# Patient Record
Sex: FEMALE | Race: WHITE | NOT HISPANIC OR LATINO | Employment: FULL TIME | URBAN - METROPOLITAN AREA
[De-identification: names, ages, dates, MRNs, and addresses within clinical notes are randomized per-mention and may not be internally consistent; named-entity substitution may affect disease eponyms.]

---

## 2018-09-17 ENCOUNTER — OFFICE VISIT (OUTPATIENT)
Dept: OBSTETRICS AND GYNECOLOGY | Age: 43
End: 2018-09-17

## 2018-09-17 VITALS
WEIGHT: 221 LBS | BODY MASS INDEX: 31.64 KG/M2 | SYSTOLIC BLOOD PRESSURE: 122 MMHG | DIASTOLIC BLOOD PRESSURE: 78 MMHG | HEIGHT: 70 IN

## 2018-09-17 DIAGNOSIS — Z01.419 WELL WOMAN EXAM WITH ROUTINE GYNECOLOGICAL EXAM: Primary | ICD-10-CM

## 2018-09-17 DIAGNOSIS — Z12.31 SCREENING MAMMOGRAM, ENCOUNTER FOR: ICD-10-CM

## 2018-09-17 PROCEDURE — 99396 PREV VISIT EST AGE 40-64: CPT | Performed by: NURSE PRACTITIONER

## 2018-09-17 NOTE — PROGRESS NOTES
[facility]  Routine Annual Visit    2018    Patient: Pallavi Graham          MR#:[id number]      History of Present Illness    43 y.o. female  who presents for annual exam. She was last here for prenatal care . Daughter just turned 2 and is doing great. Last pap negative , HPV negative. Mammogram 2015- additional images suggested. Pallavi reports these were done and normal- calling for results.  Pallavi is using tubal ligation for contraception. Normal monthly cycles. However, she reports she is not used to having heavy cycles lasting almost full week- was on OCPs for years with light cycles. She is interested in discussing options for reducing flow.     Patient's last menstrual period was 2018.  Obstetric History:  OB History      Para Term  AB Living    3 3 3 0 0 3    SAB TAB Ectopic Molar Multiple Live Births    0 0 0   0 3        Obstetric Comments    3/16/1619 weeks normal completed anatomic survey, low-lying placenta, female fetus  6.14.16 - 32-2 weeks, EFW 76%, AC 86% ,BPP 8/8 , JHF  7.5.16 - 35-2 weeks, EFW 50%, AC 86%, SASKIA-18cm, BPP 8/8 -JHF         Menstrual History:     Patient's last menstrual period was 2018.       Sexual History:       ________________________________________  Patient Active Problem List   Diagnosis   • Sterilization consult       Past Medical History:   Diagnosis Date   • Migraine    • PONV (postoperative nausea and vomiting)        Past Surgical History:   Procedure Laterality Date   • ADENOIDECTOMY     • CHOLECYSTECTOMY      [facility] [place]   • TONSILLECTOMY     • TUBAL COAGULATION LAPAROSCOPIC Bilateral 2016    Procedure: TUBAL FALLOPE FILSHIE CLIPPING LAPAROSCOPIC;  Surgeon: Himanshu Ambrocio MD;  Location: [facility] OR;  Service:    • WISDOM TOOTH EXTRACTION         History   Smoking Status   • Never Smoker   Smokeless Tobacco   • Never Used       Family History   Problem Relation Age of Onset   • Hypertension Mother  "   • Diabetes Maternal Grandmother    • Ovarian cancer Maternal Grandmother    • Diabetes Maternal Grandfather    • Diabetes Paternal Grandmother    • Diabetes Paternal Grandfather    • Heart defect Sister        Prior to Admission medications    Medication Sig Start Date End Date Taking? Authorizing Provider   oxyCODONE-acetaminophen (PERCOCET) 5-325 MG per tablet Take 1 tablet by mouth every 4 (four) hours as needed for moderate pain (4-6). 9/1/16   Himanshu Ambrocio MD   Prenatal MV-Min-Fe Fum-FA-DHA (PRENATAL 1 PO) Take 1 tablet by mouth daily. 11/23/15   ProviderJerry MD     ________________________________________      The following portions of the patient's history were reviewed and updated as appropriate: allergies, current medications, past family history, past medical history, past social history, past surgical history and problem list.    Review of Systems   Constitutional: Negative.    HENT: Negative.    Eyes: Negative for visual disturbance.   Respiratory: Negative for cough, shortness of breath and wheezing.    Cardiovascular: Negative for chest pain, palpitations and leg swelling.   Gastrointestinal: Negative for abdominal distention, abdominal pain, blood in stool, constipation, diarrhea, nausea and vomiting.   Endocrine: Negative for cold intolerance and heat intolerance.   Genitourinary: Negative for difficulty urinating, dyspareunia, dysuria, frequency, genital sores, hematuria, menstrual problem, pelvic pain, urgency, vaginal bleeding, vaginal discharge and vaginal pain.   Musculoskeletal: Negative.    Skin: Negative.    Neurological: Negative for dizziness, weakness, light-headedness, numbness and headaches.   Hematological: Negative.    Psychiatric/Behavioral: Negative.    Breasts: negative for lumps skin changes, dimpling, swelling, nipple changes/discharge bilaterally       Objective   Physical Exam    /78   Ht 177.8 cm (70\")   Wt 100 kg (221 lb)   LMP 09/06/2018   BMI 31.71 " "kg/m²    BP Readings from Last 3 Encounters:   09/17/18 122/78   09/01/16 113/67   08/30/16 113/78      Wt Readings from Last 3 Encounters:   09/17/18 100 kg (221 lb)   09/01/16 88.9 kg (196 lb)   08/30/16 87.1 kg (192 lb)        BMI: Estimated body mass index is 31.71 kg/m² as calculated from the following:    Height as of this encounter: 177.8 cm (70\").    Weight as of this encounter: 100 kg (221 lb).        General:   alert, appears stated age and cooperative   Heart: regular rate and rhythm, S1, S2 normal, no murmur, click, rub or gallop   Lungs: clear to auscultation bilaterally   Abdomen: soft, non-tender, without masses or organomegaly   Breast: inspection negative, no nipple discharge or bleeding, no masses or nodularity palpable   Vulva: normal   Vagina: normal mucosa, normal discharge   Cervix: no cervical motion tenderness and no lesions   Uterus: normal size, mobile, non-tender or normal shape and consistency   Adnexa: exam limited by habitus     Assessment:    1. Normal annual exam   2. Heavy cycles- options discussed- OCPs, mirena, ablation. Pallavi desires low dose oral contraceptives for now but is highly considering uterine ablation. Consult appointment made with Dr. Ambrocio. OCP: initiation of contraceptive pills. We have discussed the risks and benefits of oral contraceptives. We have discussed the health benefits of oral contraceptives including improvement of acne, dysmenorrhea, PMS, decreased risk ovarian and uterine cancer later in life and decreased menstrual flow or suppression. We have also discussed possible side effects including nausea or vomiting at initiation, breast tenderness, and increased risk deep vein thrombosis. We have reviewed the ACHES (abdominal pain, chest pain, headache, leg pain, vision changes, shortness of breath) and to notify our office in the event of any of these symptoms. She verbalizes understanding  3. Healthy lifestyle modifications discussed, counseled on self " breast exams and bone health        Plan:    [x]  Rx: lo loestrin  []  Mammogram request made  [x]  PAP done  []  Occult fecal blood test (Insure)  []  Labs:   []  GC/Chl/TV  []  DEXA scan   []  Referral for colonoscopy:           RYLEY Burden  9/17/2018 1:44 PM

## 2018-09-21 ENCOUNTER — TELEPHONE (OUTPATIENT)
Dept: OBSTETRICS AND GYNECOLOGY | Age: 43
End: 2018-09-21

## 2018-09-21 LAB
CYTOLOGIST CVX/VAG CYTO: NORMAL
CYTOLOGY CVX/VAG DOC THIN PREP: NORMAL
DX ICD CODE: NORMAL
HIV 1 & 2 AB SER-IMP: NORMAL
HPV I/H RISK 4 DNA CVX QL PROBE+SIG AMP: NEGATIVE
Lab: NORMAL
OTHER STN SPEC: NORMAL
PATH REPORT.FINAL DX SPEC: NORMAL
STAT OF ADQ CVX/VAG CYTO-IMP: NORMAL

## 2018-09-21 NOTE — TELEPHONE ENCOUNTER
----- Message from RYLEY Delaney sent at 9/21/2018  8:53 AM EDT -----  Please inform patient her pap smear returned negative (normal). Thank you.

## 2018-10-04 ENCOUNTER — HOSPITAL ENCOUNTER (OUTPATIENT)
Dept: MAMMOGRAPHY | Facility: HOSPITAL | Age: 43
Discharge: HOME | End: 2018-10-04
Admitting: NURSE PRACTITIONER

## 2018-10-04 DIAGNOSIS — Z12.31 SCREENING MAMMOGRAM, ENCOUNTER FOR: ICD-10-CM

## 2018-10-04 PROCEDURE — 77067 SCR MAMMO BI INCL CAD: CPT

## 2018-10-22 ENCOUNTER — PROCEDURE VISIT (OUTPATIENT)
Dept: OBSTETRICS AND GYNECOLOGY | Age: 43
End: 2018-10-22

## 2018-10-22 ENCOUNTER — OFFICE VISIT (OUTPATIENT)
Dept: OBSTETRICS AND GYNECOLOGY | Age: 43
End: 2018-10-22

## 2018-10-22 VITALS
HEIGHT: 70 IN | BODY MASS INDEX: 31.78 KG/M2 | DIASTOLIC BLOOD PRESSURE: 88 MMHG | WEIGHT: 222 LBS | SYSTOLIC BLOOD PRESSURE: 136 MMHG

## 2018-10-22 DIAGNOSIS — N93.9 ABNORMAL UTERINE BLEEDING (AUB): Primary | ICD-10-CM

## 2018-10-22 PROCEDURE — 99213 OFFICE O/P EST LOW 20 MIN: CPT | Performed by: OBSTETRICS & GYNECOLOGY

## 2018-10-22 PROCEDURE — 76830 TRANSVAGINAL US NON-OB: CPT | Performed by: OBSTETRICS & GYNECOLOGY

## 2018-10-22 RX ORDER — HEPATITIS A VACCINE 1440 [IU]/ML
INJECTION, SUSPENSION INTRAMUSCULAR SEE ADMIN INSTRUCTIONS
Refills: 1 | COMMUNITY
Start: 2018-10-17 | End: 2019-10-02

## 2018-10-22 RX ORDER — FEXOFENADINE HCL 180 MG/1
180 TABLET ORAL AS NEEDED
COMMUNITY

## 2018-10-22 RX ORDER — INFLUENZA A VIRUS A/MICHIGAN/45/2015 X-275 (H1N1) ANTIGEN (FORMALDEHYDE INACTIVATED), INFLUENZA A VIRUS A/SINGAPORE/INFIMH-16-0019/2016 IVR-186 (H3N2) ANTIGEN (FORMALDEHYDE INACTIVATED), INFLUENZA B VIRUS B/PHUKET/3073/2013 ANTIGEN (FORMALDEHYDE INACTIVATED), AND INFLUENZA B VIRUS B/MARYLAND/15/2016 BX-69A ANTIGEN (FORMALDEHYDE INACTIVATED) 15; 15; 15; 15 UG/.5ML; UG/.5ML; UG/.5ML; UG/.5ML
INJECTION, SUSPENSION INTRAMUSCULAR
Refills: 0 | COMMUNITY
Start: 2018-10-19 | End: 2019-10-02

## 2018-10-22 RX ORDER — RIZATRIPTAN BENZOATE 10 MG/1
10 TABLET ORAL ONCE AS NEEDED
COMMUNITY
Start: 2018-07-26 | End: 2019-10-02 | Stop reason: SDUPTHER

## 2018-10-22 NOTE — PROGRESS NOTES
10/22/2018      Patient:  Pallavi Graham   MR#:[id number]    Office note    Chief Complaint   Patient presents with   • Consult     Ablation per EP       Subjective     History of Present Illness  43 y.o. female  tense for consult on possible endometrial ablation.  The patient reports since having had her tubal ligation and discontinuing oral contraceptive pills that her periods are prolonged and heavy.  Her menstrual cycles are mostly regular with a cycle approximately 28 days but she has heavy vaginal bleeding that's intermittent for at least 7 days each month.  The patient was started on oral contraceptive pills by the nurse practitioner but has not yet completed a month of OCPs.  The patient previously reported good cycle control and management of menstrual flow with oral contraceptive pills.    We discussed options for managing her abnormal uterine bleeding.  She needs ultrasound to evaluate the uterine cavity and exclude uterine pathology.  With prior success with oral contraceptive pills I suggested she continue those for several months and evaluate their efficacy.  If oral contraceptive pills prove ineffective ablation could be considered.      Patient Active Problem List   Diagnosis   • Abnormal uterine bleeding (AUB)       Past Medical History:   Diagnosis Date   • Migraine    • PONV (postoperative nausea and vomiting)        Past Surgical History:   Procedure Laterality Date   • ADENOIDECTOMY     • CHOLECYSTECTOMY      [facility]   • TONSILLECTOMY     • TUBAL COAGULATION LAPAROSCOPIC Bilateral 2016    Procedure: TUBAL FALLOPE FILSHIE CLIPPING LAPAROSCOPIC;  Surgeon: Himanshu Ambrocio MD;  Location: [facility];  Service:    • WISDOM TOOTH EXTRACTION         Obstetric History:  OB History      Para Term  AB Living    3 3 3 0 0 3    SAB TAB Ectopic Molar Multiple Live Births    0 0 0   0 3         Menstrual History:     Patient's last menstrual period was 10/01/2018 (exact  date).       #: 1, Date: 2004, Sex: Male, Weight: 3147 g (6 lb 15 oz), GA: 39w0d, Delivery: Vaginal, Spontaneous Delivery, Apgar1: None, Apgar5: None, Living: Living, Birth Comments: reviewed by dr. anne marie juarez    #: 2, Date: 2009, Sex: Male, Weight: 3799 g (8 lb 6 oz), GA: 40w0d, Delivery: Vaginal, Spontaneous Delivery, Apgar1: None, Apgar5: None, Living: Living, Birth Comments: reviewed by apple juarez    #: 3, Date: 08/01/16, Sex: Female, Weight: 3606 g (7 lb 15.2 oz), GA: 39w1d, Delivery: Vaginal, Spontaneous Delivery, Apgar1: 9, Apgar5: 9, Living: Living, Birth Comments: None      Family History   Problem Relation Age of Onset   • Hypertension Mother    • Diabetes Maternal Grandmother    • Ovarian cancer Maternal Grandmother    • Diabetes Maternal Grandfather    • Diabetes Paternal Grandmother    • Diabetes Paternal Grandfather    • Heart defect Sister        Social History   Substance Use Topics   • Smoking status: Never Smoker   • Smokeless tobacco: Never Used   • Alcohol use No       Patient has no known allergies.      Current Outpatient Prescriptions:   •  fexofenadine (ALLEGRA) 180 MG tablet, Take 180 mg by mouth As Needed., Disp: , Rfl:   •  FLUZONE QUADRIVALENT suspension IM suspension, USE AS DIRECTED. FOR ONE DOSE, Disp: , Rfl: 0  •  HAVRIX 1440 EL U/ML vaccine, See Admin Instructions., Disp: , Rfl: 1  •  Multiple Vitamin (MULTI-VITAMIN DAILY PO), Take  by mouth., Disp: , Rfl:   •  Norethin-Eth Estrad-Fe Biphas (LO LOESTRIN FE) 1 MG-10 MCG / 10 MCG tablet, Take 1 tablet by mouth Daily., Disp: 28 tablet, Rfl: 12  •  rizatriptan (MAXALT) 10 MG tablet, , Disp: , Rfl:   •  oxyCODONE-acetaminophen (PERCOCET) 5-325 MG per tablet, Take 1 tablet by mouth every 4 (four) hours as needed for moderate pain (4-6)., Disp: 20 tablet, Rfl: 0  •  Prenatal MV-Min-Fe Fum-FA-DHA (PRENATAL 1 PO), Take 1 tablet by mouth daily., Disp: , Rfl:     The following portions of the patient's history were reviewed and  "updated as appropriate: allergies, current medications, past family history, past medical history, past social history, past surgical history and problem list.    Review of Systems   Constitutional: Negative.    Respiratory: Negative.    Cardiovascular: Negative.    Gastrointestinal: Negative.    Genitourinary: Positive for menstrual problem and vaginal bleeding.   Psychiatric/Behavioral: Negative.        BP Readings from Last 3 Encounters:   10/22/18 136/88   09/17/18 122/78   09/01/16 113/67      Wt Readings from Last 3 Encounters:   10/22/18 101 kg (222 lb)   09/17/18 100 kg (221 lb)   09/01/16 88.9 kg (196 lb)      BMI: Estimated body mass index is 31.85 kg/m² as calculated from the following:    Height as of this encounter: 177.8 cm (70\").    Weight as of this encounter: 101 kg (222 lb).  BSA: Estimated body surface area is 2.18 meters squared as calculated from the following:    Height as of this encounter: 177.8 cm (70\").    Weight as of this encounter: 101 kg (222 lb).    Objective   Physical Exam   Constitutional: She is oriented to person, place, and time. She appears well-developed and well-nourished.   HENT:   Head: Normocephalic and atraumatic.   Cardiovascular: Normal rate.    Pulmonary/Chest: Effort normal.   Abdominal: Soft. Bowel sounds are normal. She exhibits no distension. There is no tenderness.   Genitourinary: Vagina normal and uterus normal.   Genitourinary Comments: Small normal mobile uterus without significant pathology.  Exam is mildly limited by habitus   Neurological: She is alert and oriented to person, place, and time.   Skin: Skin is warm and dry.   Psychiatric: She has a normal mood and affect. Her behavior is normal. Judgment and thought content normal.   Nursing note and vitals reviewed.        Assessment/Plan     Pallavi was seen today for consult.    Diagnoses and all orders for this visit:    Abnormal uterine bleeding (AUB)    Suggest GYN ultrasound to exclude uterine " pathology.  Continue oral contraceptive pills.  If they become ineffective will consider ablation      No Follow-up on file.        Himanshu Ambrocio MD   10/22/2018 11:05 AM

## 2019-10-02 ENCOUNTER — OFFICE VISIT (OUTPATIENT)
Dept: OBSTETRICS AND GYNECOLOGY | Age: 44
End: 2019-10-02

## 2019-10-02 VITALS
HEIGHT: 70 IN | SYSTOLIC BLOOD PRESSURE: 124 MMHG | WEIGHT: 216 LBS | BODY MASS INDEX: 30.92 KG/M2 | DIASTOLIC BLOOD PRESSURE: 76 MMHG

## 2019-10-02 DIAGNOSIS — Z12.4 PAP SMEAR FOR CERVICAL CANCER SCREENING: ICD-10-CM

## 2019-10-02 DIAGNOSIS — Z13.89 SCREENING FOR BLOOD OR PROTEIN IN URINE: ICD-10-CM

## 2019-10-02 DIAGNOSIS — Z12.31 SCREENING MAMMOGRAM, ENCOUNTER FOR: ICD-10-CM

## 2019-10-02 DIAGNOSIS — Z01.419 WELL FEMALE EXAM WITH ROUTINE GYNECOLOGICAL EXAM: Primary | ICD-10-CM

## 2019-10-02 DIAGNOSIS — G43.C0 PERIODIC HEADACHE SYNDROME, NOT INTRACTABLE: ICD-10-CM

## 2019-10-02 DIAGNOSIS — N93.9 ABNORMAL UTERINE BLEEDING (AUB): ICD-10-CM

## 2019-10-02 LAB
BILIRUB BLD-MCNC: NEGATIVE MG/DL
CLARITY, POC: CLEAR
COLOR UR: YELLOW
GLUCOSE UR STRIP-MCNC: NEGATIVE MG/DL
KETONES UR QL: NEGATIVE
LEUKOCYTE EST, POC: NEGATIVE
NITRITE UR-MCNC: NEGATIVE MG/ML
PH UR: 5.5 [PH] (ref 5–8)
PROT UR STRIP-MCNC: NEGATIVE MG/DL
RBC # UR STRIP: ABNORMAL /UL
SP GR UR: 1.02 (ref 1–1.03)
UROBILINOGEN UR QL: NORMAL

## 2019-10-02 PROCEDURE — 99396 PREV VISIT EST AGE 40-64: CPT | Performed by: OBSTETRICS & GYNECOLOGY

## 2019-10-02 PROCEDURE — 99213 OFFICE O/P EST LOW 20 MIN: CPT | Performed by: OBSTETRICS & GYNECOLOGY

## 2019-10-02 PROCEDURE — 81002 URINALYSIS NONAUTO W/O SCOPE: CPT | Performed by: OBSTETRICS & GYNECOLOGY

## 2019-10-02 RX ORDER — RIZATRIPTAN BENZOATE 10 MG/1
10 TABLET ORAL ONCE AS NEEDED
Qty: 9 TABLET | Refills: 5 | Status: SHIPPED | OUTPATIENT
Start: 2019-10-02 | End: 2020-10-05 | Stop reason: SDUPTHER

## 2019-10-02 NOTE — PROGRESS NOTES
Routine Annual Visit    10/2/2019    Patient: Pallavi Graham          MR#:[id number]      History of Present Illness    Chief Complaint   Patient presents with   • Gynecologic Exam     Last pap 18, negative  ; Last mammo 10/4/18, negative.  Cycles are heavy, lasts 1-1/2 weeks, bleeding every 2 weeks.  Severe cramping, flooding. Mood swings, migraines-hormone related. Discuss Ablation.       44 y.o. female  who presents for annual exam.    The patient presents for annual exam reporting continued problems with intermittent moderate to heavy vaginal bleeding with prolonged menses.  She states her menstrual cycles are lasting approximately 10 days with exceptionally heavy days for 3.  Previous ultrasound showed a mildly enlarged uterus without significant pathology.  The patient was prescribed low-dose oral contraceptive pills last year for management of the bleeding but has seen minimal impact of the pills on the amount of bleeding.  Options were discussed last year and the patient was to pursue endometrial ablation.    The patient reports worsening migraines in the last year as well.  She gets good relief from Maxalt and takes the medicine approximately 3 times per month.  She is requesting a refill on that medication today.        Patient's last menstrual period was 2019.  Obstetric History:  OB History      Para Term  AB Living    3 3 3 0 0 3    SAB TAB Ectopic Molar Multiple Live Births    0 0 0   0 3         Menstrual History:     Patient's last menstrual period was 2019.       Sexual History:       ________________________________________  Patient Active Problem List   Diagnosis   • Abnormal uterine bleeding (AUB)   • Periodic headache syndrome       Past Medical History:   Diagnosis Date   • Migraine    • PONV (postoperative nausea and vomiting)        Past Surgical History:   Procedure Laterality Date   • ADENOIDECTOMY     • CHOLECYSTECTOMY      [facility] [place]   •  TONSILLECTOMY  2001   • TUBAL COAGULATION LAPAROSCOPIC Bilateral 9/1/2016    Procedure: TUBAL FALLOPE FILSHIE CLIPPING LAPAROSCOPIC;  Surgeon: Himanshu Ambrocio MD;  Location: [facility] OR;  Service:    • WISDOM TOOTH EXTRACTION         Social History     Tobacco Use   Smoking Status Never Smoker   Smokeless Tobacco Never Used       Family History   Problem Relation Age of Onset   • Hypertension Mother    • Diabetes Maternal Grandmother    • Ovarian cancer Maternal Grandmother    • Diabetes Maternal Grandfather    • Diabetes Paternal Grandmother    • Diabetes Paternal Grandfather    • Heart defect Sister        Prior to Admission medications    Medication Sig Start Date End Date Taking? Authorizing Provider   fexofenadine (ALLEGRA) 180 MG tablet Take 180 mg by mouth As Needed.   Yes Jerry Woods MD   FLUZONE QUADRIVALENT suspension IM suspension USE AS DIRECTED. FOR ONE DOSE 10/19/18  Yes Jerry Woods MD   HAVRIX 1440 EL U/ML vaccine See Admin Instructions. 10/17/18  Yes Jerry Woods MD   Multiple Vitamin (MULTI-VITAMIN DAILY PO) Take  by mouth.   Yes Jerry Woods MD   Norethin-Eth Estrad-Fe Biphas (LO LOESTRIN FE) 1 MG-10 MCG / 10 MCG tablet Take 1 tablet by mouth Daily. 9/17/18  Yes Jammie Lees APRN   rizatriptan (MAXALT) 10 MG tablet Take 10 mg by mouth 1 (One) Time As Needed. 7/26/18  Yes Jerry Woods MD   oxyCODONE-acetaminophen (PERCOCET) 5-325 MG per tablet Take 1 tablet by mouth every 4 (four) hours as needed for moderate pain (4-6). 9/1/16   Himanshu Ambrocio MD   Prenatal MV-Min-Fe Fum-FA-DHA (PRENATAL 1 PO) Take 1 tablet by mouth daily. 11/23/15   Jerry Woods MD     ________________________________________    Current contraception: tubal ligation  History of abnormal Pap smear: no  Family history of uterine or ovarian cancer: no  Family History of colon cancer/colon polyps: no  History of abnormal mammogram: no  History of abnormal lipids:  "no    The following portions of the patient's history were reviewed and updated as appropriate: allergies, current medications, past family history, past medical history, past social history, past surgical history and problem list.    Review of Systems    Pertinent items are noted in HPI.     Objective   Physical Exam    /76   Ht 177.8 cm (70\")   Wt 98 kg (216 lb)   LMP 09/20/2019   Breastfeeding? No   BMI 30.99 kg/m²    BP Readings from Last 3 Encounters:   10/02/19 124/76   10/22/18 136/88   09/17/18 122/78      Wt Readings from Last 3 Encounters:   10/02/19 98 kg (216 lb)   10/22/18 101 kg (222 lb)   09/17/18 100 kg (221 lb)        BMI: Estimated body mass index is 30.99 kg/m² as calculated from the following:    Height as of this encounter: 177.8 cm (70\").    Weight as of this encounter: 98 kg (216 lb).       General: alert, appears stated age and cooperative   Heart: regular rate and rhythm, S1, S2 normal, no murmur, click, rub or gallop   Lungs: clear to auscultation bilaterally   Abdomen: soft, non-tender, without masses, no organomegaly   Breast: inspection negative, no nipple discharge or bleeding, no masses or nodularity palpable   Vulva: normal and bartholin's, Urethra, Dalton's normal   Vagina: normal mucosa, normal discharge   Cervix: multiparous appearance and no lesions   Uterus: normal size, mobile or non-tender   Adnexa: exam limited by habitus     As part of wellness and prevention, the following topics were discussed with the patient:     []  Nutrition  []  Physical activity/regular exercise   []  Healthy weight  []  Injury prevention  []  Smoking cessation  []  Substance misuse/abuse  []  Sexual behavior  []  STD prevention  []  Contaception  []  Dental health  []  Mental health  []  Immunization  [x]  Encouraged SBE        Assessment:    Pallavi was seen today for gynecologic exam.    Diagnoses and all orders for this visit:    Well female exam with routine gynecological exam  -     " IgP, Aptima HPV    Screening for blood or protein in urine  -     POC Urinalysis Dipstick    Pap smear for cervical cancer screening  -     IgP, Aptima HPV    Abnormal uterine bleeding (AUB)  -     Case Request    Periodic headache syndrome, not intractable  -     rizatriptan (MAXALT) 10 MG tablet; Take 1 tablet by mouth 1 (One) Time As Needed for Migraine.    Screening mammogram, encounter for  -     Mammo Screening Bilateral With CAD; Future          Plan:  Return in about 1 year (around 10/2/2020) for Annual GYN exam.      Himanshu Ambrocio MD  10/2/2019 9:13 AM

## 2019-10-04 LAB
CYTOLOGIST CVX/VAG CYTO: NORMAL
CYTOLOGY CVX/VAG DOC CYTO: NORMAL
CYTOLOGY CVX/VAG DOC THIN PREP: NORMAL
DX ICD CODE: NORMAL
HIV 1 & 2 AB SER-IMP: NORMAL
HPV I/H RISK 4 DNA CVX QL PROBE+SIG AMP: NEGATIVE
OTHER STN SPEC: NORMAL
STAT OF ADQ CVX/VAG CYTO-IMP: NORMAL

## 2019-10-22 ENCOUNTER — APPOINTMENT (OUTPATIENT)
Dept: PREADMISSION TESTING | Facility: HOSPITAL | Age: 44
End: 2019-10-22

## 2019-10-22 VITALS
WEIGHT: 219 LBS | HEIGHT: 70 IN | RESPIRATION RATE: 16 BRPM | SYSTOLIC BLOOD PRESSURE: 128 MMHG | OXYGEN SATURATION: 99 % | DIASTOLIC BLOOD PRESSURE: 82 MMHG | TEMPERATURE: 98.4 F | BODY MASS INDEX: 31.35 KG/M2 | HEART RATE: 88 BPM

## 2019-10-22 LAB
ANION GAP SERPL CALCULATED.3IONS-SCNC: 12 MMOL/L (ref 5–15)
BUN BLD-MCNC: 13 MG/DL (ref 6–20)
BUN/CREAT SERPL: 13.1 (ref 7–25)
CALCIUM SPEC-SCNC: 9.5 MG/DL (ref 8.6–10.5)
CHLORIDE SERPL-SCNC: 101 MMOL/L (ref 98–107)
CO2 SERPL-SCNC: 26 MMOL/L (ref 22–29)
CREAT BLD-MCNC: 0.99 MG/DL (ref 0.57–1)
DEPRECATED RDW RBC AUTO: 41.1 FL (ref 37–54)
ERYTHROCYTE [DISTWIDTH] IN BLOOD BY AUTOMATED COUNT: 12.7 % (ref 12.3–15.4)
GFR SERPL CREATININE-BSD FRML MDRD: 61 ML/MIN/1.73
GLUCOSE BLD-MCNC: 83 MG/DL (ref 65–99)
HCG SERPL QL: NEGATIVE
HCT VFR BLD AUTO: 43.7 % (ref 34–46.6)
HGB BLD-MCNC: 14.8 G/DL (ref 12–15.9)
MCH RBC QN AUTO: 29.9 PG (ref 26.6–33)
MCHC RBC AUTO-ENTMCNC: 33.9 G/DL (ref 31.5–35.7)
MCV RBC AUTO: 88.3 FL (ref 79–97)
PLATELET # BLD AUTO: 278 10*3/MM3 (ref 140–450)
PMV BLD AUTO: 9.7 FL (ref 6–12)
POTASSIUM BLD-SCNC: 3.5 MMOL/L (ref 3.5–5.2)
RBC # BLD AUTO: 4.95 10*6/MM3 (ref 3.77–5.28)
SODIUM BLD-SCNC: 139 MMOL/L (ref 136–145)
WBC NRBC COR # BLD: 7.02 10*3/MM3 (ref 3.4–10.8)

## 2019-10-22 PROCEDURE — 80048 BASIC METABOLIC PNL TOTAL CA: CPT | Performed by: OBSTETRICS & GYNECOLOGY

## 2019-10-22 PROCEDURE — 36415 COLL VENOUS BLD VENIPUNCTURE: CPT

## 2019-10-22 PROCEDURE — 85027 COMPLETE CBC AUTOMATED: CPT | Performed by: OBSTETRICS & GYNECOLOGY

## 2019-10-22 PROCEDURE — 84703 CHORIONIC GONADOTROPIN ASSAY: CPT | Performed by: OBSTETRICS & GYNECOLOGY

## 2019-10-22 NOTE — DISCHARGE INSTRUCTIONS
Take the following medications the morning of surgery with a small sip of water:    NONE    Arrive at 8:00    General Instructions:  • Do not eat solid food after midnight the night before surgery.  • You may drink clear liquids day of surgery but must stop at least one hour before your hospital arrival time.  • It is beneficial for you to have a clear drink that contains carbohydrates the day of surgery.  We suggest a 12 to 20 ounce bottle of Gatorade or Powerade for non-diabetic patients or a 12 to 20 ounce bottle of G2 or Powerade Zero for diabetic patients. (Pediatric patients, are not advised to drink a 12 to 20 ounce carbohydrate drink)    Clear liquids are liquids you can see through.  Nothing red in color.     Plain water                               Sports drinks  Sodas                                   Gelatin (Jell-O)  Fruit juices without pulp such as white grape juice and apple juice  Popsicles that contain no fruit or yogurt  Tea or coffee (no cream or milk added)  Gatorade / Powerade  G2 / Powerade Zero    • Infants may have breast milk up to four hours before surgery.  • Infants drinking formula may drink formula up to six hours before surgery.   • Patients who avoid smoking, chewing tobacco and alcohol for 4 weeks prior to surgery have a reduced risk of post-operative complications.  Quit smoking as many days before surgery as you can.  • Do not smoke, use chewing tobacco or drink alcohol the day of surgery.   • If applicable bring your C-PAP/ BI-PAP machine.  • Bring any papers given to you in the doctor’s office.  • Wear clean comfortable clothes.  • Do not wear contact lenses, false eyelashes or make-up.  Bring a case for your glasses.   • Bring crutches or walker if applicable.  • Remove all piercings.  Leave jewelry and any other valuables at home.  • Hair extensions with metal clips must be removed prior to surgery.  • The Pre-Admission Testing nurse will instruct you to bring medications if  unable to obtain an accurate list in Pre-Admission Testing.        If you were given a blood bank ID arm band remember to bring it with you the day of surgery.    Preventing a Surgical Site Infection:  • For 2 to 3 days before surgery, avoid shaving with a razor because the razor can irritate skin and make it easier to develop an infection.    • Any areas of open skin can increase the risk of a post-operative wound infection by allowing bacteria to enter and travel throughout the body.  Notify your surgeon if you have any skin wounds / rashes even if it is not near the expected surgical site.  The area will need assessed to determine if surgery should be delayed until it is healed.  • The night prior to surgery sleep in a clean bed with clean clothing.  Do not allow pets to sleep with you.  • Shower on the morning of surgery using a fresh bar of anti-bacterial soap (such as Dial) and clean washcloth.  Dry with a clean towel and dress in clean clothing.  • Ask your surgeon if you will be receiving antibiotics prior to surgery.  • Make sure you, your family, and all healthcare providers clean their hands with soap and water or an alcohol based hand  before caring for you or your wound.    Day of surgery:  Your arrival time is approximately two hours before your scheduled surgery time.  Upon arrival, a Pre-op nurse and Anesthesiologist will review your health history, obtain vital signs, and answer questions you may have.  The only belongings needed at this time will be a list of your home medications and if applicable your C-PAP/BI-PAP machine.  If you are staying overnight your family can leave the rest of your belongings in the car and bring them to your room later.  A Pre-op nurse will start an IV and you may receive medication in preparation for surgery, including something to help you relax.  Your family will be able to see you in the Pre-op area.  Two visitors at a time will be allowed in the Pre-op  room.  While you are in surgery your family should notify the waiting room  if they leave the waiting room area and provide a contact phone number.    Please be aware that surgery does come with discomfort.  We want to make every effort to control your discomfort so please discuss any uncontrolled symptoms with your nurse.   Your doctor will most likely have prescribed pain medications.      If you are going home after surgery you will receive individualized written care instructions before being discharged.  A responsible adult must drive you to and from the hospital on the day of your surgery and stay with you for 24 hours.    If you are staying overnight following surgery, you will be transported to your hospital room following the recovery period.  [facility] has all private rooms.    You have received a list of surgical assistants for your reference.  If you have any questions please call Pre-Admission Testing at [phone].  Deductibles and co-payments are collected on the day of service. Please be prepared to pay the required co-pay, deductible or deposit on the day of service as defined by your plan.

## 2019-10-24 ENCOUNTER — ANESTHESIA (OUTPATIENT)
Dept: PERIOP | Facility: HOSPITAL | Age: 44
End: 2019-10-24

## 2019-10-24 ENCOUNTER — HOSPITAL ENCOUNTER (OUTPATIENT)
Facility: HOSPITAL | Age: 44
Setting detail: HOSPITAL OUTPATIENT SURGERY
Discharge: HOME | End: 2019-10-24
Attending: OBSTETRICS & GYNECOLOGY | Admitting: OBSTETRICS & GYNECOLOGY

## 2019-10-24 ENCOUNTER — ANESTHESIA EVENT (OUTPATIENT)
Dept: PERIOP | Facility: HOSPITAL | Age: 44
End: 2019-10-24

## 2019-10-24 VITALS
DIASTOLIC BLOOD PRESSURE: 88 MMHG | BODY MASS INDEX: 30.84 KG/M2 | SYSTOLIC BLOOD PRESSURE: 129 MMHG | OXYGEN SATURATION: 97 % | HEIGHT: 70 IN | RESPIRATION RATE: 16 BRPM | HEART RATE: 72 BPM | TEMPERATURE: 98 F | WEIGHT: 215.44 LBS

## 2019-10-24 DIAGNOSIS — N93.9 ABNORMAL UTERINE BLEEDING (AUB): ICD-10-CM

## 2019-10-24 PROCEDURE — 25010000003 CEFAZOLIN IN DEXTROSE 2-4 GM/100ML-% SOLUTION: Performed by: OBSTETRICS & GYNECOLOGY

## 2019-10-24 PROCEDURE — 88305 TISSUE EXAM BY PATHOLOGIST: CPT | Performed by: OBSTETRICS & GYNECOLOGY

## 2019-10-24 PROCEDURE — 25010000002 MIDAZOLAM PER 1 MG: Performed by: ANESTHESIOLOGY

## 2019-10-24 PROCEDURE — 25010000002 KETOROLAC TROMETHAMINE PER 15 MG: Performed by: NURSE ANESTHETIST, CERTIFIED REGISTERED

## 2019-10-24 PROCEDURE — 25010000002 DEXAMETHASONE PER 1 MG: Performed by: NURSE ANESTHETIST, CERTIFIED REGISTERED

## 2019-10-24 PROCEDURE — 25010000002 ONDANSETRON PER 1 MG: Performed by: NURSE ANESTHETIST, CERTIFIED REGISTERED

## 2019-10-24 PROCEDURE — 25010000002 FENTANYL CITRATE (PF) 100 MCG/2ML SOLUTION: Performed by: NURSE ANESTHETIST, CERTIFIED REGISTERED

## 2019-10-24 PROCEDURE — 58563 HYSTEROSCOPY ABLATION: CPT | Performed by: OBSTETRICS & GYNECOLOGY

## 2019-10-24 PROCEDURE — 25010000002 PROPOFOL 10 MG/ML EMULSION: Performed by: NURSE ANESTHETIST, CERTIFIED REGISTERED

## 2019-10-24 RX ORDER — PROMETHAZINE HYDROCHLORIDE 25 MG/ML
6.25 INJECTION, SOLUTION INTRAMUSCULAR; INTRAVENOUS
Status: DISCONTINUED | OUTPATIENT
Start: 2019-10-24 | End: 2019-10-24 | Stop reason: HOSPADM

## 2019-10-24 RX ORDER — EPHEDRINE SULFATE 50 MG/ML
5 INJECTION, SOLUTION INTRAVENOUS ONCE AS NEEDED
Status: DISCONTINUED | OUTPATIENT
Start: 2019-10-24 | End: 2019-10-24 | Stop reason: HOSPADM

## 2019-10-24 RX ORDER — SODIUM CHLORIDE 0.9 % (FLUSH) 0.9 %
3 SYRINGE (ML) INJECTION EVERY 12 HOURS SCHEDULED
Status: DISCONTINUED | OUTPATIENT
Start: 2019-10-24 | End: 2019-10-24 | Stop reason: HOSPADM

## 2019-10-24 RX ORDER — MIDAZOLAM HYDROCHLORIDE 1 MG/ML
1 INJECTION INTRAMUSCULAR; INTRAVENOUS
Status: DISCONTINUED | OUTPATIENT
Start: 2019-10-24 | End: 2019-10-24 | Stop reason: HOSPADM

## 2019-10-24 RX ORDER — KETOROLAC TROMETHAMINE 30 MG/ML
INJECTION, SOLUTION INTRAMUSCULAR; INTRAVENOUS AS NEEDED
Status: DISCONTINUED | OUTPATIENT
Start: 2019-10-24 | End: 2019-10-24 | Stop reason: SURG

## 2019-10-24 RX ORDER — ACETAMINOPHEN 325 MG/1
650 TABLET ORAL ONCE AS NEEDED
Status: DISCONTINUED | OUTPATIENT
Start: 2019-10-24 | End: 2019-10-24 | Stop reason: HOSPADM

## 2019-10-24 RX ORDER — ONDANSETRON HYDROCHLORIDE 8 MG/1
8 TABLET, FILM COATED ORAL EVERY 8 HOURS PRN
Qty: 15 TABLET | Refills: 1 | Status: SHIPPED | OUTPATIENT
Start: 2019-10-24 | End: 2019-11-11

## 2019-10-24 RX ORDER — FLUMAZENIL 0.1 MG/ML
0.2 INJECTION INTRAVENOUS AS NEEDED
Status: DISCONTINUED | OUTPATIENT
Start: 2019-10-24 | End: 2019-10-24 | Stop reason: HOSPADM

## 2019-10-24 RX ORDER — HYDROMORPHONE HYDROCHLORIDE 1 MG/ML
0.5 INJECTION, SOLUTION INTRAMUSCULAR; INTRAVENOUS; SUBCUTANEOUS
Status: DISCONTINUED | OUTPATIENT
Start: 2019-10-24 | End: 2019-10-24 | Stop reason: HOSPADM

## 2019-10-24 RX ORDER — SCOLOPAMINE TRANSDERMAL SYSTEM 1 MG/1
1 PATCH, EXTENDED RELEASE TRANSDERMAL ONCE
Status: DISCONTINUED | OUTPATIENT
Start: 2019-10-24 | End: 2019-10-24 | Stop reason: HOSPADM

## 2019-10-24 RX ORDER — PROMETHAZINE HYDROCHLORIDE 25 MG/1
25 SUPPOSITORY RECTAL ONCE AS NEEDED
Status: DISCONTINUED | OUTPATIENT
Start: 2019-10-24 | End: 2019-10-24 | Stop reason: HOSPADM

## 2019-10-24 RX ORDER — MIDAZOLAM HYDROCHLORIDE 1 MG/ML
2 INJECTION INTRAMUSCULAR; INTRAVENOUS
Status: DISCONTINUED | OUTPATIENT
Start: 2019-10-24 | End: 2019-10-24 | Stop reason: HOSPADM

## 2019-10-24 RX ORDER — PROMETHAZINE HYDROCHLORIDE 25 MG/1
25 TABLET ORAL ONCE AS NEEDED
Status: DISCONTINUED | OUTPATIENT
Start: 2019-10-24 | End: 2019-10-24 | Stop reason: HOSPADM

## 2019-10-24 RX ORDER — SODIUM CHLORIDE, SODIUM LACTATE, POTASSIUM CHLORIDE, CALCIUM CHLORIDE 600; 310; 30; 20 MG/100ML; MG/100ML; MG/100ML; MG/100ML
9 INJECTION, SOLUTION INTRAVENOUS CONTINUOUS
Status: DISCONTINUED | OUTPATIENT
Start: 2019-10-24 | End: 2019-10-24 | Stop reason: HOSPADM

## 2019-10-24 RX ORDER — HYDRALAZINE HYDROCHLORIDE 20 MG/ML
5 INJECTION INTRAMUSCULAR; INTRAVENOUS
Status: DISCONTINUED | OUTPATIENT
Start: 2019-10-24 | End: 2019-10-24 | Stop reason: HOSPADM

## 2019-10-24 RX ORDER — FENTANYL CITRATE 50 UG/ML
INJECTION, SOLUTION INTRAMUSCULAR; INTRAVENOUS AS NEEDED
Status: DISCONTINUED | OUTPATIENT
Start: 2019-10-24 | End: 2019-10-24 | Stop reason: SURG

## 2019-10-24 RX ORDER — ONDANSETRON 2 MG/ML
4 INJECTION INTRAMUSCULAR; INTRAVENOUS ONCE AS NEEDED
Status: DISCONTINUED | OUTPATIENT
Start: 2019-10-24 | End: 2019-10-24 | Stop reason: HOSPADM

## 2019-10-24 RX ORDER — SODIUM CHLORIDE 0.9 % (FLUSH) 0.9 %
3-10 SYRINGE (ML) INJECTION AS NEEDED
Status: DISCONTINUED | OUTPATIENT
Start: 2019-10-24 | End: 2019-10-24 | Stop reason: HOSPADM

## 2019-10-24 RX ORDER — DIPHENHYDRAMINE HYDROCHLORIDE 50 MG/ML
12.5 INJECTION INTRAMUSCULAR; INTRAVENOUS
Status: DISCONTINUED | OUTPATIENT
Start: 2019-10-24 | End: 2019-10-24 | Stop reason: HOSPADM

## 2019-10-24 RX ORDER — DIPHENHYDRAMINE HCL 25 MG
25 CAPSULE ORAL
Status: DISCONTINUED | OUTPATIENT
Start: 2019-10-24 | End: 2019-10-24 | Stop reason: HOSPADM

## 2019-10-24 RX ORDER — OXYCODONE AND ACETAMINOPHEN 7.5; 325 MG/1; MG/1
1 TABLET ORAL ONCE AS NEEDED
Status: DISCONTINUED | OUTPATIENT
Start: 2019-10-24 | End: 2019-10-24 | Stop reason: HOSPADM

## 2019-10-24 RX ORDER — CEFAZOLIN SODIUM 2 G/100ML
2 INJECTION, SOLUTION INTRAVENOUS ONCE
Status: COMPLETED | OUTPATIENT
Start: 2019-10-24 | End: 2019-10-24

## 2019-10-24 RX ORDER — ACETAMINOPHEN 500 MG
500 TABLET ORAL ONCE
Status: COMPLETED | OUTPATIENT
Start: 2019-10-24 | End: 2019-10-24

## 2019-10-24 RX ORDER — PROMETHAZINE HYDROCHLORIDE 25 MG/ML
12.5 INJECTION, SOLUTION INTRAMUSCULAR; INTRAVENOUS ONCE AS NEEDED
Status: DISCONTINUED | OUTPATIENT
Start: 2019-10-24 | End: 2019-10-24 | Stop reason: HOSPADM

## 2019-10-24 RX ORDER — LIDOCAINE HYDROCHLORIDE 10 MG/ML
0.5 INJECTION, SOLUTION EPIDURAL; INFILTRATION; INTRACAUDAL; PERINEURAL ONCE AS NEEDED
Status: DISCONTINUED | OUTPATIENT
Start: 2019-10-24 | End: 2019-10-24 | Stop reason: HOSPADM

## 2019-10-24 RX ORDER — IBUPROFEN 800 MG/1
800 TABLET ORAL EVERY 8 HOURS PRN
Qty: 30 TABLET | Refills: 1 | Status: SHIPPED | OUTPATIENT
Start: 2019-10-24 | End: 2021-11-01

## 2019-10-24 RX ORDER — LABETALOL HYDROCHLORIDE 5 MG/ML
5 INJECTION, SOLUTION INTRAVENOUS
Status: DISCONTINUED | OUTPATIENT
Start: 2019-10-24 | End: 2019-10-24 | Stop reason: HOSPADM

## 2019-10-24 RX ORDER — HYDROCODONE BITARTRATE AND ACETAMINOPHEN 7.5; 325 MG/1; MG/1
1 TABLET ORAL ONCE AS NEEDED
Status: DISCONTINUED | OUTPATIENT
Start: 2019-10-24 | End: 2019-10-24 | Stop reason: HOSPADM

## 2019-10-24 RX ORDER — DEXAMETHASONE SODIUM PHOSPHATE 10 MG/ML
INJECTION INTRAMUSCULAR; INTRAVENOUS AS NEEDED
Status: DISCONTINUED | OUTPATIENT
Start: 2019-10-24 | End: 2019-10-24 | Stop reason: SURG

## 2019-10-24 RX ORDER — ACETAMINOPHEN AND CODEINE PHOSPHATE 300; 30 MG/1; MG/1
1-2 TABLET ORAL EVERY 4 HOURS PRN
Qty: 20 TABLET | Refills: 0 | Status: SHIPPED | OUTPATIENT
Start: 2019-10-24 | End: 2019-11-11

## 2019-10-24 RX ORDER — FENTANYL CITRATE 50 UG/ML
50 INJECTION, SOLUTION INTRAMUSCULAR; INTRAVENOUS
Status: DISCONTINUED | OUTPATIENT
Start: 2019-10-24 | End: 2019-10-24 | Stop reason: HOSPADM

## 2019-10-24 RX ORDER — PROPOFOL 10 MG/ML
VIAL (ML) INTRAVENOUS AS NEEDED
Status: DISCONTINUED | OUTPATIENT
Start: 2019-10-24 | End: 2019-10-24 | Stop reason: SURG

## 2019-10-24 RX ORDER — NALOXONE HCL 0.4 MG/ML
0.2 VIAL (ML) INJECTION AS NEEDED
Status: DISCONTINUED | OUTPATIENT
Start: 2019-10-24 | End: 2019-10-24 | Stop reason: HOSPADM

## 2019-10-24 RX ORDER — ONDANSETRON 2 MG/ML
INJECTION INTRAMUSCULAR; INTRAVENOUS AS NEEDED
Status: DISCONTINUED | OUTPATIENT
Start: 2019-10-24 | End: 2019-10-24 | Stop reason: SURG

## 2019-10-24 RX ORDER — SODIUM CHLORIDE 9 MG/ML
INJECTION, SOLUTION INTRAVENOUS AS NEEDED
Status: DISCONTINUED | OUTPATIENT
Start: 2019-10-24 | End: 2019-10-24 | Stop reason: HOSPADM

## 2019-10-24 RX ORDER — MAGNESIUM HYDROXIDE 1200 MG/15ML
LIQUID ORAL AS NEEDED
Status: DISCONTINUED | OUTPATIENT
Start: 2019-10-24 | End: 2019-10-24 | Stop reason: HOSPADM

## 2019-10-24 RX ORDER — FAMOTIDINE 10 MG/ML
20 INJECTION, SOLUTION INTRAVENOUS ONCE
Status: COMPLETED | OUTPATIENT
Start: 2019-10-24 | End: 2019-10-24

## 2019-10-24 RX ADMIN — DEXAMETHASONE SODIUM PHOSPHATE 8 MG: 10 INJECTION INTRAMUSCULAR; INTRAVENOUS at 12:55

## 2019-10-24 RX ADMIN — CEFAZOLIN SODIUM 2 G: 2 INJECTION, SOLUTION INTRAVENOUS at 12:52

## 2019-10-24 RX ADMIN — FAMOTIDINE 20 MG: 10 INJECTION INTRAVENOUS at 08:37

## 2019-10-24 RX ADMIN — PROPOFOL 200 MG: 10 INJECTION, EMULSION INTRAVENOUS at 12:49

## 2019-10-24 RX ADMIN — KETOROLAC TROMETHAMINE 30 MG: 30 INJECTION, SOLUTION INTRAMUSCULAR; INTRAVENOUS at 13:05

## 2019-10-24 RX ADMIN — SCOPOLAMINE 1 PATCH: 1 PATCH TRANSDERMAL at 08:37

## 2019-10-24 RX ADMIN — SODIUM CHLORIDE, POTASSIUM CHLORIDE, SODIUM LACTATE AND CALCIUM CHLORIDE 9 ML/HR: 600; 310; 30; 20 INJECTION, SOLUTION INTRAVENOUS at 08:37

## 2019-10-24 RX ADMIN — MIDAZOLAM 2 MG: 1 INJECTION INTRAMUSCULAR; INTRAVENOUS at 08:37

## 2019-10-24 RX ADMIN — ONDANSETRON 4 MG: 2 INJECTION INTRAMUSCULAR; INTRAVENOUS at 12:55

## 2019-10-24 RX ADMIN — ACETAMINOPHEN 500 MG: 500 TABLET, FILM COATED ORAL at 10:16

## 2019-10-24 RX ADMIN — FENTANYL CITRATE 50 MCG: 50 INJECTION INTRAMUSCULAR; INTRAVENOUS at 12:49

## 2019-10-24 NOTE — ANESTHESIA POSTPROCEDURE EVALUATION
Patient: Pallavi Graham    Procedure Summary     Date:  10/24/19 Room / Location:  [facility] OR  / [facility] MAIN OR    Anesthesia Start:  1245 Anesthesia Stop:  1319    Procedure:  DILATATION AND CURETTAGE HYSTEROSCOPY NOVASURE ENDOMETRIAL ABLATION (N/A Vagina) Diagnosis:       Abnormal uterine bleeding (AUB)      (Abnormal uterine bleeding (AUB) [N93.9])    Surgeon:  Himanshu Ambrocio MD Provider:  Daphnie Faustin MD    Anesthesia Type:  general ASA Status:  3          Anesthesia Type: general  Last vitals  BP   133/88 (10/24/19 1347)   Temp   36.7 °C (98 °F) (10/24/19 1316)   Pulse   81 (10/24/19 1347)   Resp   16 (10/24/19 1347)     SpO2   97 % (10/24/19 1347)     Post Anesthesia Care and Evaluation    Patient location during evaluation: PACU  Patient participation: complete - patient participated  Level of consciousness: awake and alert  Pain management: adequate  Airway patency: patent  Anesthetic complications: No anesthetic complications    Cardiovascular status: acceptable  Respiratory status: acceptable  Hydration status: acceptable    Comments: --------------------            10/24/19               1347     --------------------   BP:       133/88     Pulse:      81       Resp:       16       Temp:                SpO2:      97%      --------------------

## 2019-10-24 NOTE — ANESTHESIA PREPROCEDURE EVALUATION
Anesthesia Evaluation     history of anesthetic complications: PONV               Airway   Mallampati: II  TM distance: >3 FB  Neck ROM: full  No difficulty expected  Dental - normal exam     Pulmonary - normal exam   Cardiovascular - normal exam        Neuro/Psych  (+) headaches,     GI/Hepatic/Renal/Endo      Musculoskeletal     Abdominal    Substance History      OB/GYN          Other                        Anesthesia Plan    ASA 3     general     intravenous induction   Anesthetic plan, all risks, benefits, and alternatives have been provided, discussed and informed consent has been obtained with: patient.       Message  Patient will call to schedule her follow up appointment for 1 month and set up her ct   She needs to check her work schedule      Active Problems    1  Acute URI (465 9) (J06 9)   2  Anxiety (300 00) (F41 9)   3  Benign familial tremor (333 1) (G25 0)   4  Carotid stenosis (433 10) (I65 29)   5  Cerebral infarct (434 91) (I63 9)   6  Clinical depression (311) (F32 9)   7  COPD (chronic obstructive pulmonary disease) (496) (J44 9)   8  Coronary arteriosclerosis (414 00) (I25 10)   9  Edema (782 3) (R60 9)   10  Esophageal reflux (530 81) (K21 9)   11  Female Stress Incontinence (625 6)   12  Generalized anxiety disorder (300 02) (F41 1)   13  Generalized osteoarthritis (715 00) (M15 9)   14  Glaucoma (365 9) (H40 9)   15  History of coronary artery bypass graft x 3 (V15 1) (Z95 1)   16  Hyperlipidemia (272 4) (E78 5)   17  Hypertension (401 9) (I10)   18  Hypothyroidism (244 9) (E03 9)   19  Hypoventilation associated with obesity syndrome (278 03) (E66 2)   20  Hypoxia (799 02) (R09 02)   21  Insomnia (780 52) (G47 00)   22  Need for influenza vaccination (V04 81) (Z23)   23  Need for pneumococcal vaccination (V03 82) (Z23)   24  Need for zoster vaccination (V04 89) (Z23)   25  Osteoarthritis of knee (715 36) (M17 10)   26  Post-nasal drip (784 91) (R09 82)   27  Postural imbalance (729 90) (R29 3)   28  Primary osteoarthritis of right knee (715 16) (M17 11)   29  Right knee DJD (715 96) (M17 11)   30  Status post left knee replacement (V43 65) (Z96 652)   31  Status post total right knee replacement (V43 65) (Z96 651)   32  Stroke syndrome (434 91) (I63 9)   33  Type 2 diabetes mellitus (250 00) (E11 9)   34  Visit for pre-operative examination (V72 84) (Z01 818)   35  Vitamin D deficiency (268 9) (E55 9)    Current Meds   1  Advair Diskus 250-50 MCG/DOSE Inhalation Aerosol Powder Breath Activated; INHALE   1 PUFF TWICE DAILY  RINSE MOUTH AFTER USE;    Therapy: 52KKW2150 to (Cherylene Bodo)  Requested for: 02Jun2017; Last   DC:86VIB5469 Ordered   2  AmLODIPine Besylate 5 MG Oral Tablet; Take 1 tablet daily; Therapy: 04GLJ7424 to [phone])  Requested for: 68OTQ5018; Last   CA:67YPX6739 Ordered   3  Aspirin 81 MG Oral Tablet Delayed Release; Take 1 tablet daily; Therapy: 26VWN0050 to (Evaluate:26Apr2018) Recorded   4  Atorvastatin Calcium 40 MG Oral Tablet; TAKE ONE TABLET BY MOUTH ONCE DAILY   AS DIRECTED; Therapy: 06CFR4563 to (Last Rx:27Aqn2761)  Requested for: 39Hko6010 Ordered   5  Clopidogrel Bisulfate 75 MG Oral Tablet; TAKE ONE TABLET BY MOUTH ONCE DAILY; Therapy: 49Qpk6089 to (Last Rx:77Qdm2698)  Requested for: 13Fml2175 Ordered   6  DiazePAM 5 MG Oral Tablet; TAKE 1 TABLET 3 times daily PRN; Therapy: 65BZU7119 to [phone]  Requested for: 02Qpc9479; Last   Rx:26Rle3131 Ordered   7  Gabapentin 100 MG Oral Capsule; TAKE THREE CAPSULE BY MOUTH DAILY AS   DIRECTED; Therapy: 91Wyw8725 to (Last Rx:11Ymq1969)  Requested for: 35Frt2188 Ordered   8  Levothyroxine Sodium 75 MCG Oral Tablet; TAKE ONE TABLET BY MOUTH ONCE   DAILY; Therapy: 85SCU4608 to (Last Rx:04Jun2017)  Requested for: 99KHU1784 Ordered   9  Lumigan 0 01 % Ophthalmic Solution; INSTILL 1 DROP INTO BOTH EYES ONCE DAILY   IN THE EVENING; Therapy: 48Aju2270 to Recorded   10  Neupro 4 MG/24HR Transdermal Patch 24 Hour; APPLY 1 PATCH DAILY AS DIRECTED; Therapy: 01MBO8931 to (Last Rx:43Smc2885)  Requested for: 90Lsd0782 Ordered   11  Oxycodone-Acetaminophen 5-325 MG Oral Tablet; TAKE 1 TABLET Every 6 hours PRN    pain; Therapy: 71PSJ6045 to (Evaluate:38Ntt8777); Last Rx:93Tzs7548 Ordered   12  Pantoprazole Sodium 40 MG Oral Tablet Delayed Release; TAKE ONE TABLET BY    MOUTH ONCE DAILY; Therapy: 44Fam4140 to (Last Rx:19Jun2017)  Requested for: 44LQL8327 Ordered   13  Viibryd 40 MG Oral Tablet; TAKE ONE TABLET BY MOUTH ONCE DAILY;     Therapy: 02ATH9859 to (Last Rx:36Tke4989)  Requested for: 17Sep2017 Ordered 14  Zithromax Z-Vincenzo 250 MG Oral Tablet; TAKE 2 TABLETS ON DAY 1 THEN TAKE 1    TABLET A DAY FOR 4 DAYS; Therapy: 20Xzr0492 to (Last Jose Rodriguez)  Requested for: 49Kih9092 Ordered   15  Zolpidem Tartrate 10 MG Oral Tablet; TAKE 1 TABLET AT  BEDTIME AS NEEDED FOR    INSOMNIA; Therapy: 95JYF5411 to (Evaluate:09Oct2017); Last Rx:10Aug2017 Ordered   16  Zostavax 52524 UNT/0 65ML Subcutaneous Solution Reconstituted; inject subq; Therapy: 27QPI9433 to (Last Rx:09Nov2016) Ordered    Allergies    1   Penicillins    Signatures   Electronically signed by : Saleem Lopez, ; Oct  5 2017  1:50PM EST                       (Author)

## 2019-10-24 NOTE — ANESTHESIA PROCEDURE NOTES
Airway  Urgency: elective    Date/Time: 10/24/2019 12:49 PM  Airway not difficult    General Information and Staff    Patient location during procedure: OR  CRNA: Ines Edmondson CRNA    Indications and Patient Condition  Indications for airway management: airway protection    Preoxygenated: yes  Mask difficulty assessment: 0 - not attempted    Final Airway Details  Final airway type: supraglottic airway      Successful airway: classic  Size 4    Number of attempts at approach: 1    Additional Comments  LMA inserted without difficulty.  Lips and teeth intact as preop condition.  No signs or symptoms of gastric regurgitation.  Seal with minimal pressure and secure.

## 2019-10-24 NOTE — DISCHARGE INSTRUCTIONS
HOW DO I REST MY PELVIS?  For as long as told by your health care provider:  · Do not have sex, sexual stimulation, or an orgasm.  · Do not use tampons. Do not douche. Do not put anything in your vagina.  · Avoid activities that take a lot of effort (are strenuous).  · Avoid any activity in which your pelvic muscles could become strained.    Scopolamine Patch  This patch has been applied to the skin behind one of your ears.  It may stay in place up to 24 hours. You may remove it at any time after your surgery; however, it should be removed after you are up and walking around the next day.  This medicine reduces stomach upset. Side effects may include: dry mouth, dizziness, sleepiness, constipation, or upset stomach.  An allergy would show up as: a rash, itching, wheezing or shortness of breath.  Follow these instructions:  1. Do not drink alcohol, drive or operate machinery while taking this medicine.  2. Wear only 1 patch at a time. You can leave the patch on for up to 24 hours.  3. When you remove the patch, fold it in half with the sticky sides together and throw it away. Wash your hands and the area under the patch.  4. Do not touch your eye with your hand if it has touched the patch.  5. Wash your hands well before and after touching the patch.  6. Sit or stand slowly to avoid dizziness.  Call your doctor if you have:  1. Any sign of allergy  2. No relief  3. Trouble passing urine  4. Any new or severe symptoms            You had 500 mg of Acetaminophen at 1016 and the maximum dose rosemary 24 hour period is 4000 mg    You had a medicine similar to Motrin at 1 pm do not take  Motrin until after 7 pm

## 2019-10-24 NOTE — OP NOTE
DILATATION AND CURETTAGE HYSTEROSCOPY NOVASURE ENDOMETRIAL ABLATION  Procedure Report    Patient Name:  Pallavi Graham  YOB: 1975    Date of Surgery:  10/24/2019     Indications:  AUB    Pre-op Diagnosis:   Abnormal uterine bleeding (AUB) [N93.9]       Post-Op Diagnosis Codes:     * Abnormal uterine bleeding (AUB) [N93.9]    Procedure/CPT® Codes:      Procedure(s):  DILATATION AND CURETTAGE HYSTEROSCOPY NOVASURE ENDOMETRIAL ABLATION    Staff:  Surgeon(s):  Himanshu Ambrocio MD         Anesthesia: General    Estimated Blood Loss: 20 mL    Implants:    Nothing was implanted during the procedure    Specimen:   * No order type specified *               Findings: unremarkable uterine cavity     Complications: None    Description of Procedure:     The patient was taken to the operating room and placed in supine position.  General anesthesia was administered and the patient was prepped and draped in the usual sterile fashion with her legs placed in Stan stirrups.  Padding was provided so that her legs did not make contact with the Stan stirrups.  The surgical time-out is completed for the procedure    Weighted speculum was placed in the vagina and the cervix was grasped anteriorly with a single-tooth tenaculum.  Sequential dilators were used to dilate the cervix to 16 Citizen of [place].  The uterus sounds to 11 cm.  The MyoSure scope was inserted and the cavity was distended with normal saline.  Findings were consistent with a fairly large cavity without any cavitary lesions or impingements on the lining from submucosal fibroids.  The tubal ostia are identified bilaterally.    A #1 curet is taken and  the cavity is systematically curetted.  Fragments of endometrium are retained for pathological examination.    NovaSure device is inserted and set at a length of 6.0 cm and deployed at a width of 4.1 cm.  A 62 second cycle is completed without any complications.  Very scant bleeding is noted thereafter.  The  patient's awakened and taken to the recovery room in stable condition.          Himanshu Ambrocio MD     Date: 10/24/2019  Time: 1:08 PM

## 2019-10-24 NOTE — H&P
History & Physical    10/24/2019    Patient: Pallavi Graham          MR#:[id number]    Chief complaint: Abnormal uterine bleeding    Subjective     44 y.o. female   presents for annual exam reporting continued problems with intermittent moderate to heavy vaginal bleeding with prolonged menses.  She states her menstrual cycles are lasting approximately 10 days with exceptionally heavy for 3 days.  Previous ultrasound showed a mildly enlarged uterus without significant pathology.  The patient was prescribed low-dose oral contraceptive pills last year for management of the bleeding but has seen minimal impact of the pills on the amount of bleeding.  Options were discussed last year and the patient was to pursue endometrial ablation.        Patient Active Problem List   Diagnosis   • Abnormal uterine bleeding (AUB)   • Periodic headache syndrome       Past Medical History:   Diagnosis Date   • Heavy menstrual period    • Migraine    • PONV (postoperative nausea and vomiting)    • Spinal headache     WITH DELIVERY  BLOOD PATCH       Past Surgical History:   Procedure Laterality Date   • ADENOIDECTOMY     • CHOLECYSTECTOMY      LSC   • TONSILLECTOMY     • TUBAL COAGULATION LAPAROSCOPIC Bilateral 2016    Procedure: TUBAL FALLOPE FILSHIE CLIPPING LAPAROSCOPIC;  Surgeon: Himanshu Ambrocio MD;  Location: [facility];  Service:    • WISDOM TOOTH EXTRACTION         Obstetric History:  OB History      Para Term  AB Living    3 3 3 0 0 3    SAB TAB Ectopic Molar Multiple Live Births    0 0 0   0 3         Menstrual History:     Patient's last menstrual period was 10/19/2019.       #: 1, Date: , Sex: Male, Weight: 3147 g (6 lb 15 oz), GA: 39w0d, Delivery: Vaginal, Spontaneous, Apgar1: None, Apgar5: None, Living: Living, Birth Comments: reviewed by dr. kenney - gildardo juarez    #: 2, Date: , Sex: Male, Weight: 3799 g (8 lb 6 oz), GA: 40w0d, Delivery: Vaginal, Spontaneous, Apgar1: None,  Apgar5: None, Living: Living, Birth Comments: reviewed by apple juarez    #: 3, Date: 08/01/16, Sex: Female, Weight: 3606 g (7 lb 15.2 oz), GA: 39w1d, Delivery: Vaginal, Spontaneous, Apgar1: 9, Apgar5: 9, Living: Living, Birth Comments: None      Family History   Problem Relation Age of Onset   • Hypertension Mother    • Diabetes Maternal Grandmother    • Ovarian cancer Maternal Grandmother    • Diabetes Maternal Grandfather    • Diabetes Paternal Grandmother    • Diabetes Paternal Grandfather    • Heart defect Sister    • Malig Hyperthermia Neg Hx        Social History     Tobacco Use   • Smoking status: Never Smoker   • Smokeless tobacco: Never Used   Substance Use Topics   • Alcohol use: No   • Drug use: No       Patient has no known allergies.    No current facility-administered medications for this encounter.     Current Outpatient Medications:   •  fexofenadine (ALLEGRA) 180 MG tablet, Take 180 mg by mouth As Needed., Disp: , Rfl:   •  Multiple Vitamin (MULTI-VITAMIN DAILY PO), Take 1 tablet by mouth Every Morning., Disp: , Rfl:   •  rizatriptan (MAXALT) 10 MG tablet, Take 1 tablet by mouth 1 (One) Time As Needed for Migraine., Disp: 9 tablet, Rfl: 5    Review of Systems  Review of Systems   Constitutional: Negative.    Respiratory: Negative.    Cardiovascular: Negative.    Gastrointestinal: Positive for abdominal pain.   Genitourinary: Positive for menstrual problem, pelvic pain and vaginal bleeding.   Neurological: Positive for headaches.   Psychiatric/Behavioral: Negative.        Objective     Vital Signs       Physical Exam:  Physical Exam   Constitutional: She is oriented to person, place, and time. She appears well-developed and well-nourished.   HENT:   Head: Normocephalic and atraumatic.   Cardiovascular: Normal rate.   Pulmonary/Chest: Effort normal.   Abdominal: Soft. Bowel sounds are normal. She exhibits no distension. There is no tenderness.   Neurological: She is alert and oriented to person,  place, and time.   Skin: Skin is warm and dry.   Psychiatric: She has a normal mood and affect. Her behavior is normal. Judgment and thought content normal.   Nursing note and vitals reviewed.      Labs:  Lab Results (last 24 hours)     ** No results found for the last 24 hours. **            Hospital problem list:    Abnormal uterine bleeding (AUB)      Assessment/Plan     1. Abnormal uterine bleeding        Plan:  Hysteroscopy, dilatation & curettage, endometrial ablation with NovaSure         Reviewed procedure with patient.  I discussed the risks including but not limited to bleeding, infection and damage to internal organs.  Understanding of the procedure is voiced.     Himanshu Ambrocio MD  10/24/19  7:24 AM      Patient Care Team:  Delvis Fernandez MD as PCP - General

## 2019-10-25 LAB
CYTO UR: NORMAL
LAB AP CASE REPORT: NORMAL
PATH REPORT.FINAL DX SPEC: NORMAL
PATH REPORT.GROSS SPEC: NORMAL

## 2019-11-05 ENCOUNTER — HOSPITAL ENCOUNTER (OUTPATIENT)
Dept: MAMMOGRAPHY | Facility: HOSPITAL | Age: 44
Discharge: HOME | End: 2019-11-05
Admitting: OBSTETRICS & GYNECOLOGY

## 2019-11-05 DIAGNOSIS — Z12.31 SCREENING MAMMOGRAM, ENCOUNTER FOR: ICD-10-CM

## 2019-11-05 PROCEDURE — 77067 SCR MAMMO BI INCL CAD: CPT

## 2019-11-07 ENCOUNTER — TELEPHONE (OUTPATIENT)
Dept: OBSTETRICS AND GYNECOLOGY | Age: 44
End: 2019-11-07

## 2019-11-07 NOTE — TELEPHONE ENCOUNTER
----- Message from Himanshu Ambrocio MD sent at 11/7/2019  2:51 AM EST -----  Call patient: Normal mammogram

## 2019-11-11 ENCOUNTER — OFFICE VISIT (OUTPATIENT)
Dept: OBSTETRICS AND GYNECOLOGY | Age: 44
End: 2019-11-11

## 2019-11-11 VITALS
WEIGHT: 215 LBS | SYSTOLIC BLOOD PRESSURE: 104 MMHG | DIASTOLIC BLOOD PRESSURE: 78 MMHG | HEIGHT: 70 IN | BODY MASS INDEX: 30.78 KG/M2

## 2019-11-11 DIAGNOSIS — Z09 POSTOP CHECK: Primary | ICD-10-CM

## 2019-11-11 PROCEDURE — 99212 OFFICE O/P EST SF 10 MIN: CPT | Performed by: OBSTETRICS & GYNECOLOGY

## 2019-11-11 NOTE — PROGRESS NOTES
Postop Visit    2019    Patient: Pallavi Graham          MR#:[id number]    History of Present Illness    Chief Complaint   Patient presents with   • Post-op Follow-up     2 weeks, 4 days. D&C, ablation. Doing well, still some spotting.         44 y.o. female  status post uncomplicated endometrial ablation procedure.  The patient presents for routine postop check feeling well without major complaints of discomfort.  Appropriate discharge is noted and the patient's had some expected spotting.       ________________________________________  Patient Active Problem List   Diagnosis   • Abnormal uterine bleeding (AUB)   • Periodic headache syndrome       Past Medical History:   Diagnosis Date   • Heavy menstrual period    • Migraine    • PONV (postoperative nausea and vomiting)    • Spinal headache     WITH DELIVERY  BLOOD PATCH       Past Surgical History:   Procedure Laterality Date   • ADENOIDECTOMY     • CHOLECYSTECTOMY      LSC   • D&C HYSTEROSCOPY ENDOMETRIAL ABLATION N/A 10/24/2019    Procedure: DILATATION AND CURETTAGE HYSTEROSCOPY NOVASURE ENDOMETRIAL ABLATION;  Surgeon: Himanshu Ambrocio MD;  Location: [facility];  Service: Obstetrics/Gynecology   • TONSILLECTOMY     • TUBAL COAGULATION LAPAROSCOPIC Bilateral 2016    Procedure: TUBAL FALLOPE FILSHIE CLIPPING LAPAROSCOPIC;  Surgeon: Himanshu Ambrocio MD;  Location: [facility];  Service:    • WISDOM TOOTH EXTRACTION         Social History     Tobacco Use   Smoking Status Never Smoker   Smokeless Tobacco Never Used       has a current medication list which includes the following prescription(s): fexofenadine, ibuprofen, multiple vitamin, and rizatriptan.  ________________________________________  Review of Systems   Constitutional: Negative.    Respiratory: Negative.    Cardiovascular: Negative.    Gastrointestinal: Negative.    Genitourinary: Positive for vaginal bleeding.   Psychiatric/Behavioral: Negative.          "    Objective       /78   Ht 177.8 cm (70\")   Wt 97.5 kg (215 lb)   LMP 10/19/2019 (Exact Date)   Breastfeeding? No   BMI 30.85 kg/m²    BP Readings from Last 3 Encounters:   11/11/19 104/78   10/24/19 129/88   10/22/19 128/82      Wt Readings from Last 3 Encounters:   11/11/19 97.5 kg (215 lb)   10/24/19 97.7 kg (215 lb 7 oz)   10/22/19 99.3 kg (219 lb)      BMI: Estimated body mass index is 30.85 kg/m² as calculated from the following:    Height as of this encounter: 177.8 cm (70\").    Weight as of this encounter: 97.5 kg (215 lb).    EXAM     General:     Patient appears well in NAD  Abdomen: Soft, NT, +BS, no acute findings  Pelvic:  Scant spotting   Incision: None  Ext:  No cyanosis, edema 1-2    Assessment:    Pallavi was seen today for post-op follow-up.    Diagnoses and all orders for this visit:    Postop check        Plan:   No Follow-up on file.      Himanshu Ambrocio MD  11/11/2019 12:02 PM    "

## 2020-10-05 ENCOUNTER — OFFICE VISIT (OUTPATIENT)
Dept: OBSTETRICS AND GYNECOLOGY | Age: 45
End: 2020-10-05

## 2020-10-05 VITALS
WEIGHT: 228 LBS | DIASTOLIC BLOOD PRESSURE: 74 MMHG | BODY MASS INDEX: 32.64 KG/M2 | SYSTOLIC BLOOD PRESSURE: 118 MMHG | HEIGHT: 70 IN

## 2020-10-05 DIAGNOSIS — Z13.0 SCREENING, ANEMIA, DEFICIENCY, IRON: ICD-10-CM

## 2020-10-05 DIAGNOSIS — Z12.4 SCREENING FOR MALIGNANT NEOPLASM OF THE CERVIX: ICD-10-CM

## 2020-10-05 DIAGNOSIS — Z13.29 SCREENING FOR THYROID DISORDER: ICD-10-CM

## 2020-10-05 DIAGNOSIS — Z13.1 SCREENING FOR DIABETES MELLITUS: ICD-10-CM

## 2020-10-05 DIAGNOSIS — Z01.419 WELL FEMALE EXAM WITH ROUTINE GYNECOLOGICAL EXAM: Primary | ICD-10-CM

## 2020-10-05 DIAGNOSIS — G43.C0 PERIODIC HEADACHE SYNDROME, NOT INTRACTABLE: ICD-10-CM

## 2020-10-05 DIAGNOSIS — Z13.220 SCREENING FOR LIPID DISORDERS: ICD-10-CM

## 2020-10-05 DIAGNOSIS — Z13.228 SCREENING FOR METABOLIC DISORDER: ICD-10-CM

## 2020-10-05 DIAGNOSIS — Z13.21 ENCOUNTER FOR VITAMIN DEFICIENCY SCREENING: ICD-10-CM

## 2020-10-05 PROCEDURE — 99396 PREV VISIT EST AGE 40-64: CPT | Performed by: OBSTETRICS & GYNECOLOGY

## 2020-10-05 RX ORDER — RIZATRIPTAN BENZOATE 10 MG/1
10 TABLET ORAL ONCE AS NEEDED
Qty: 9 TABLET | Refills: 5 | Status: SHIPPED | OUTPATIENT
Start: 2020-10-05 | End: 2021-11-01

## 2020-10-05 NOTE — PROGRESS NOTES
Routine Annual Visit    10/5/2020    Patient: Pallavi Graham          MR#:[id number]      History of Present Illness    Chief Complaint   Patient presents with   • Gynecologic Exam     cc: annual visit today , last pap 10/02/19 neg , mmg at [facility] 19 neg no complaints       45 y.o. female  who presents for annual exam.    The patient presents for routine annual exam feeling well without complaints.  She has had tremendous success with endometrial ablation.  As well the patient's migraines frequency has decreased significantly since the ablation.  She is asking for refill on her Maxalt.    The patient and her family had COVID a couple months ago without major symptoms    No LMP recorded (lmp unknown). Patient has had an ablation.  Obstetric History:  OB History        3    Para   3    Term   3       0    AB   0    Living   3       SAB   0    TAB   0    Ectopic   0    Molar        Multiple   0    Live Births   3               Menstrual History:     No LMP recorded (lmp unknown). Patient has had an ablation.       Sexual History:       ________________________________________  Patient Active Problem List   Diagnosis   • Abnormal uterine bleeding (AUB)   • Periodic headache syndrome       Past Medical History:   Diagnosis Date   • Heavy menstrual period    • Migraine    • PONV (postoperative nausea and vomiting)    • Spinal headache     WITH DELIVERY 2004 BLOOD PATCH       Past Surgical History:   Procedure Laterality Date   • ADENOIDECTOMY     • CHOLECYSTECTOMY      [facility] [place]   • D&C HYSTEROSCOPY ENDOMETRIAL ABLATION N/A 10/24/2019    Procedure: DILATATION AND CURETTAGE HYSTEROSCOPY NOVASURE ENDOMETRIAL ABLATION;  Surgeon: Himanshu Ambrocio MD;  Location: [facility] OR;  Service: Obstetrics/Gynecology   • TONSILLECTOMY     • TUBAL COAGULATION LAPAROSCOPIC Bilateral 2016    Procedure: TUBAL FALLOPE FILSHIE CLIPPING LAPAROSCOPIC;  Surgeon: Himanshu Ambrocio MD;  Location: [facility]  OR;  Service:    • WISDOM TOOTH EXTRACTION         Social History     Tobacco Use   Smoking Status Never Smoker   Smokeless Tobacco Never Used       Family History   Problem Relation Age of Onset   • Hypertension Mother    • Uterine cancer Mother    • Diabetes Maternal Grandmother    • Ovarian cancer Maternal Grandmother    • Diabetes Maternal Grandfather    • Diabetes Paternal Grandmother    • Diabetes Paternal Grandfather    • Heart defect Sister    • No Known Problems Son    • No Known Problems Son    • Malig Hyperthermia Neg Hx    • Breast cancer Neg Hx        Prior to Admission medications    Medication Sig Start Date End Date Taking? Authorizing Provider   fexofenadine (ALLEGRA) 180 MG tablet Take 180 mg by mouth As Needed.   Yes Jerry Woods MD   ibuprofen (ADVIL,MOTRIN) 800 MG tablet Take 1 tablet by mouth Every 8 (Eight) Hours As Needed for Moderate Pain  for up to 30 doses. 10/24/19  Yes Himanshu Ambrocio MD   Multiple Vitamin (MULTI-VITAMIN DAILY PO) Take 1 tablet by mouth Every Morning.   Yes ProviderJerry MD   rizatriptan (MAXALT) 10 MG tablet Take 1 tablet by mouth 1 (One) Time As Needed for Migraine. 10/2/19  Yes Himanshu Ambrocio MD   phenazopyridine (PYRIDIUM) 200 MG tablet Take 1 tablet by mouth 3 (Three) Times a Day. 1/30/20 10/5/20  César Jack MD   sulfamethoxazole-trimethoprim (BACTRIM DS,SEPTRA DS) 800-160 MG per tablet One by mouth twice a day 1/30/20 10/5/20  César Jack MD     ________________________________________    Current contraception: tubal ligation  History of abnormal Pap smear: no  Family history of uterine or ovarian cancer: no  Family History of colon cancer/colon polyps: no  History of abnormal mammogram: no  History of abnormal lipids: no    The following portions of the patient's history were reviewed and updated as appropriate: allergies, current medications, past family history, past medical history, past social history, past surgical history  "and problem list.    Review of Systems    Pertinent items are noted in HPI.     Objective   Physical Exam    /74 (BP Location: Left arm, Patient Position: Sitting, Cuff Size: Large Adult)   Ht 177.8 cm (70\")   Wt 103 kg (228 lb)   LMP  (LMP Unknown)   Breastfeeding No   BMI 32.71 kg/m²    BP Readings from Last 3 Encounters:   10/05/20 118/74   01/30/20 146/88   11/11/19 104/78      Wt Readings from Last 3 Encounters:   10/05/20 103 kg (228 lb)   01/30/20 94.8 kg (209 lb)   11/11/19 97.5 kg (215 lb)        BMI: Estimated body mass index is 32.71 kg/m² as calculated from the following:    Height as of this encounter: 177.8 cm (70\").    Weight as of this encounter: 103 kg (228 lb).       General: alert, appears stated age and cooperative   Heart: regular rate and rhythm, S1, S2 normal, no murmur, click, rub or gallop   Lungs: clear to auscultation bilaterally   Abdomen: soft, non-tender, without masses, no organomegaly   Breast: inspection negative, no nipple discharge or bleeding, no masses or nodularity palpable   Vulva: normal and bartholin's, Urethra, [place]'s normal   Vagina: normal mucosa, normal discharge   Cervix: multiparous appearance and no lesions   Uterus: normal size   Adnexa: exam limited by habitus     As part of wellness and prevention, the following topics were discussed with the patient:  Encouraged self breast exam  Physical activity and regular exercised encouraged.       Assessment:    Pallavi was seen today for gynecologic exam.    Diagnoses and all orders for this visit:    Well female exam with routine gynecological exam    Screening for malignant neoplasm of the cervix  -     IgP, Aptima HPV    Encounter for vitamin deficiency screening  -     Vitamin D 25 Hydroxy    Screening, anemia, deficiency, iron  -     CBC & Differential    Screening for lipid disorders  -     Lipid Panel    Screening for thyroid disorder  -     TSH  -     T4, free    Screening for diabetes mellitus  -     " Hemoglobin A1c    Screening for metabolic disorder  -     Comprehensive Metabolic Panel    Periodic headache syndrome, not intractable  -     rizatriptan (MAXALT) 10 MG tablet; Take 1 tablet by mouth 1 (One) Time As Needed for Migraine.        Plan:  No follow-ups on file.      Himanshu Ambrocio MD  10/5/2020 09:33 EDT

## 2020-10-06 LAB
25(OH)D3+25(OH)D2 SERPL-MCNC: 29.8 NG/ML (ref 30–100)
ALBUMIN SERPL-MCNC: 4.9 G/DL (ref 3.5–5.2)
ALBUMIN/GLOB SERPL: 2.2 G/DL
ALP SERPL-CCNC: 84 U/L (ref 39–117)
ALT SERPL-CCNC: 34 U/L (ref 1–33)
AST SERPL-CCNC: 23 U/L (ref 1–32)
BASOPHILS # BLD AUTO: 0.04 10*3/MM3 (ref 0–0.2)
BASOPHILS NFR BLD AUTO: 0.7 % (ref 0–1.5)
BILIRUB SERPL-MCNC: 0.6 MG/DL (ref 0–1.2)
BUN SERPL-MCNC: 16 MG/DL (ref 6–20)
BUN/CREAT SERPL: 16.8 (ref 7–25)
CALCIUM SERPL-MCNC: 9.5 MG/DL (ref 8.6–10.5)
CHLORIDE SERPL-SCNC: 109 MMOL/L (ref 98–107)
CHOLEST SERPL-MCNC: 153 MG/DL (ref 0–200)
CO2 SERPL-SCNC: 22 MMOL/L (ref 22–29)
CREAT SERPL-MCNC: 0.95 MG/DL (ref 0.57–1)
EOSINOPHIL # BLD AUTO: 0.17 10*3/MM3 (ref 0–0.4)
EOSINOPHIL NFR BLD AUTO: 2.8 % (ref 0.3–6.2)
ERYTHROCYTE [DISTWIDTH] IN BLOOD BY AUTOMATED COUNT: 13.4 % (ref 12.3–15.4)
GLOBULIN SER CALC-MCNC: 2.2 GM/DL
GLUCOSE SERPL-MCNC: 94 MG/DL (ref 65–99)
HBA1C MFR BLD: 5 % (ref 4.8–5.6)
HCT VFR BLD AUTO: 42.4 % (ref 34–46.6)
HDLC SERPL-MCNC: 47 MG/DL (ref 40–60)
HGB BLD-MCNC: 14.6 G/DL (ref 12–15.9)
IMM GRANULOCYTES # BLD AUTO: 0.03 10*3/MM3 (ref 0–0.05)
IMM GRANULOCYTES NFR BLD AUTO: 0.5 % (ref 0–0.5)
LDLC SERPL CALC-MCNC: 89 MG/DL (ref 0–100)
LYMPHOCYTES # BLD AUTO: 1.43 10*3/MM3 (ref 0.7–3.1)
LYMPHOCYTES NFR BLD AUTO: 23.8 % (ref 19.6–45.3)
MCH RBC QN AUTO: 29.8 PG (ref 26.6–33)
MCHC RBC AUTO-ENTMCNC: 34.4 G/DL (ref 31.5–35.7)
MCV RBC AUTO: 86.5 FL (ref 79–97)
MONOCYTES # BLD AUTO: 0.6 10*3/MM3 (ref 0.1–0.9)
MONOCYTES NFR BLD AUTO: 10 % (ref 5–12)
NEUTROPHILS # BLD AUTO: 3.75 10*3/MM3 (ref 1.7–7)
NEUTROPHILS NFR BLD AUTO: 62.2 % (ref 42.7–76)
NRBC BLD AUTO-RTO: 0 /100 WBC (ref 0–0.2)
PLATELET # BLD AUTO: 254 10*3/MM3 (ref 140–450)
POTASSIUM SERPL-SCNC: 4 MMOL/L (ref 3.5–5.2)
PROT SERPL-MCNC: 7.1 G/DL (ref 6–8.5)
RBC # BLD AUTO: 4.9 10*6/MM3 (ref 3.77–5.28)
SODIUM SERPL-SCNC: 142 MMOL/L (ref 136–145)
T4 FREE SERPL-MCNC: 1.17 NG/DL (ref 0.93–1.7)
TRIGL SERPL-MCNC: 85 MG/DL (ref 0–150)
TSH SERPL DL<=0.005 MIU/L-ACNC: 1.42 UIU/ML (ref 0.27–4.2)
VLDLC SERPL CALC-MCNC: 17 MG/DL
WBC # BLD AUTO: 6.02 10*3/MM3 (ref 3.4–10.8)

## 2020-10-06 NOTE — PROGRESS NOTES
Notify patient:   Complete Blood count, Complete metabolic panel, Thyroid screening, Diabetes screening are normal    Lipid profile is good and suggests a low risk of developing heart disease.     Vitamin D level is a little low and supplementation is suggest.  Suggest 10,000 u weekly.  It is an option to take the medication daily but supplementation daily is not necessary like water soluble vitamins

## 2020-10-08 LAB
CYTOLOGIST CVX/VAG CYTO: NORMAL
CYTOLOGY CVX/VAG DOC CYTO: NORMAL
CYTOLOGY CVX/VAG DOC THIN PREP: NORMAL
DX ICD CODE: NORMAL
HIV 1 & 2 AB SER-IMP: NORMAL
HPV I/H RISK 4 DNA CVX QL PROBE+SIG AMP: NEGATIVE
Lab: NORMAL
OTHER STN SPEC: NORMAL
STAT OF ADQ CVX/VAG CYTO-IMP: NORMAL

## 2020-11-18 ENCOUNTER — CLINICAL SUPPORT (OUTPATIENT)
Dept: OBSTETRICS AND GYNECOLOGY | Age: 45
End: 2020-11-18

## 2020-11-18 DIAGNOSIS — R39.9 UTI SYMPTOMS: Primary | ICD-10-CM

## 2020-11-18 LAB
BILIRUB BLD-MCNC: ABNORMAL MG/DL
CLARITY, POC: CLEAR
COLOR UR: ABNORMAL
GLUCOSE UR STRIP-MCNC: ABNORMAL MG/DL
KETONES UR QL: ABNORMAL
LEUKOCYTE EST, POC: ABNORMAL
NITRITE UR-MCNC: POSITIVE MG/ML
PH UR: 5 [PH] (ref 5–8)
PROT UR STRIP-MCNC: ABNORMAL MG/DL
RBC # UR STRIP: NEGATIVE /UL
SP GR UR: 1.02 (ref 1–1.03)
UROBILINOGEN UR QL: NORMAL

## 2020-11-18 PROCEDURE — 81003 URINALYSIS AUTO W/O SCOPE: CPT | Performed by: PHYSICIAN ASSISTANT

## 2020-11-18 RX ORDER — NITROFURANTOIN 25; 75 MG/1; MG/1
100 CAPSULE ORAL 2 TIMES DAILY
Qty: 14 CAPSULE | Refills: 0 | Status: SHIPPED | OUTPATIENT
Start: 2020-11-18 | End: 2020-11-25

## 2020-11-20 LAB
BACTERIA UR CULT: ABNORMAL
BACTERIA UR CULT: ABNORMAL
OTHER ANTIBIOTIC SUSC ISLT: ABNORMAL

## 2020-11-23 ENCOUNTER — TELEPHONE (OUTPATIENT)
Dept: OBSTETRICS AND GYNECOLOGY | Age: 45
End: 2020-11-23

## 2020-11-23 NOTE — TELEPHONE ENCOUNTER
----- Message from VITALIY Schaefer sent at 11/23/2020  9:43 AM EST -----  Let her know her urine does show evidence of infection, I treated her at her appt. Hope she is feeling better. She should complete the full antibiotic

## 2021-04-06 ENCOUNTER — BULK ORDERING (OUTPATIENT)
Dept: CASE MANAGEMENT | Facility: OTHER | Age: 46
End: 2021-04-06

## 2021-04-06 DIAGNOSIS — Z23 IMMUNIZATION DUE: ICD-10-CM

## 2021-11-01 ENCOUNTER — TELEPHONE (OUTPATIENT)
Dept: OBSTETRICS AND GYNECOLOGY | Age: 46
End: 2021-11-01

## 2021-11-01 ENCOUNTER — OFFICE VISIT (OUTPATIENT)
Dept: OBSTETRICS AND GYNECOLOGY | Age: 46
End: 2021-11-01

## 2021-11-01 VITALS
SYSTOLIC BLOOD PRESSURE: 122 MMHG | WEIGHT: 239 LBS | DIASTOLIC BLOOD PRESSURE: 72 MMHG | BODY MASS INDEX: 34.22 KG/M2 | HEIGHT: 70 IN

## 2021-11-01 DIAGNOSIS — G43.C0 PERIODIC HEADACHE SYNDROME, NOT INTRACTABLE: ICD-10-CM

## 2021-11-01 DIAGNOSIS — R35.0 URINARY FREQUENCY: ICD-10-CM

## 2021-11-01 DIAGNOSIS — Z11.51 SCREENING FOR HUMAN PAPILLOMAVIRUS (HPV): ICD-10-CM

## 2021-11-01 DIAGNOSIS — Z13.228 SCREENING FOR METABOLIC DISORDER: ICD-10-CM

## 2021-11-01 DIAGNOSIS — Z12.31 SCREENING MAMMOGRAM, ENCOUNTER FOR: ICD-10-CM

## 2021-11-01 DIAGNOSIS — R63.5 WEIGHT GAIN: ICD-10-CM

## 2021-11-01 DIAGNOSIS — Z84.89 FAMILY HISTORY OF CARCINOMA IN SITU OF COLON: ICD-10-CM

## 2021-11-01 DIAGNOSIS — Z13.89 SCREENING FOR BLOOD OR PROTEIN IN URINE: ICD-10-CM

## 2021-11-01 DIAGNOSIS — G43.009 MIGRAINE WITHOUT AURA AND WITHOUT STATUS MIGRAINOSUS, NOT INTRACTABLE: ICD-10-CM

## 2021-11-01 DIAGNOSIS — Z12.11 SCREEN FOR COLON CANCER: ICD-10-CM

## 2021-11-01 DIAGNOSIS — Z13.0 SCREENING, ANEMIA, DEFICIENCY, IRON: ICD-10-CM

## 2021-11-01 DIAGNOSIS — Z12.4 SCREENING FOR MALIGNANT NEOPLASM OF CERVIX: ICD-10-CM

## 2021-11-01 DIAGNOSIS — Z01.419 WELL FEMALE EXAM WITH ROUTINE GYNECOLOGICAL EXAM: Primary | ICD-10-CM

## 2021-11-01 LAB
BILIRUB BLD-MCNC: NEGATIVE MG/DL
CLARITY, POC: CLEAR
COLOR UR: YELLOW
GLUCOSE UR STRIP-MCNC: NEGATIVE MG/DL
KETONES UR QL: NEGATIVE
LEUKOCYTE EST, POC: ABNORMAL
NITRITE UR-MCNC: NEGATIVE MG/ML
PH UR: 5.5 [PH] (ref 5–8)
PROT UR STRIP-MCNC: NEGATIVE MG/DL
RBC # UR STRIP: ABNORMAL /UL
SP GR UR: 1.01 (ref 1–1.03)
UROBILINOGEN UR QL: NORMAL

## 2021-11-01 PROCEDURE — 81002 URINALYSIS NONAUTO W/O SCOPE: CPT | Performed by: OBSTETRICS & GYNECOLOGY

## 2021-11-01 PROCEDURE — 99213 OFFICE O/P EST LOW 20 MIN: CPT | Performed by: OBSTETRICS & GYNECOLOGY

## 2021-11-01 PROCEDURE — 99396 PREV VISIT EST AGE 40-64: CPT | Performed by: OBSTETRICS & GYNECOLOGY

## 2021-11-01 RX ORDER — RIZATRIPTAN BENZOATE 10 MG/1
10 TABLET ORAL ONCE AS NEEDED
Qty: 9 TABLET | Refills: 5 | Status: SHIPPED | OUTPATIENT
Start: 2021-11-01 | End: 2022-11-02 | Stop reason: SDUPTHER

## 2021-11-01 RX ORDER — ERGOCALCIFEROL (VITAMIN D2) 10 MCG
400 TABLET ORAL DAILY
COMMUNITY

## 2021-11-01 NOTE — PROGRESS NOTES
[facility]   Obstetrics and Gynecology       2021    Patient: Pallavi Graham          MR#:[id number]    History of Present Illness    Chief Complaint   Patient presents with   • Gynecologic Exam     Last pap 10/2020 neg, last mg 2019    • Weight Gain     pt c/o unexplained weight gain and just feeling different; desires to see if she is in menopause   • Urinary Frequency     pt c/o urinary frequency with discomfort        46 y.o. female  who presents for annual exam.    The patient presents for her routine exam reporting that bleeding is essentially resolved she only has occasional scant episodes.  In the last year the patient's had unexplained weight gain with associated abdominal bloating.  Incidentally the patient reports mild symptoms of urgency today that she just presented in the last day.      Studies reviewed:    No LMP recorded (lmp unknown). Patient has had an ablation.  Obstetric History:  OB History        3    Para   3    Term   3       0    AB   0    Living   3       SAB   0    IAB   0    Ectopic   0    Molar        Multiple   0    Live Births   3               Menstrual History:     No LMP recorded (lmp unknown). Patient has had an ablation.       Sexual History:       ________________________________________  Patient Active Problem List   Diagnosis   • Abnormal uterine bleeding (AUB)   • Periodic headache syndrome     Past Medical History:   Diagnosis Date   • Heavy menstrual period    • Migraine    • PONV (postoperative nausea and vomiting)    • Spinal headache     WITH DELIVERY 2004 BLOOD PATCH     Past Surgical History:   Procedure Laterality Date   • ADENOIDECTOMY     • CHOLECYSTECTOMY      [facility] [place]   • D & C HYSTEROSCOPY ENDOMETRIAL ABLATION N/A 10/24/2019    Procedure: DILATATION AND CURETTAGE HYSTEROSCOPY NOVASURE ENDOMETRIAL ABLATION;  Surgeon: Himanshu Ambrocio MD;  Location: [facility] OR;  Service: Obstetrics/Gynecology   • TONSILLECTOMY      • TUBAL COAGULATION LAPAROSCOPIC Bilateral 9/1/2016    Procedure: TUBAL FALLOPE FILSHIE CLIPPING LAPAROSCOPIC;  Surgeon: Himanshu Ambrocio MD;  Location: [facility];  Service:    • WISDOM TOOTH EXTRACTION       Social History     Tobacco Use   Smoking Status Never Smoker   Smokeless Tobacco Never Used     Family History   Problem Relation Age of Onset   • Hypertension Mother    • Uterine cancer Mother    • Diabetes Maternal Grandmother    • Ovarian cancer Maternal Grandmother    • Diabetes Maternal Grandfather    • Diabetes Paternal Grandmother    • Diabetes Paternal Grandfather    • Heart defect Sister    • No Known Problems Son    • No Known Problems Son    • Malig Hyperthermia Neg Hx    • Breast cancer Neg Hx      Prior to Admission medications    Medication Sig Start Date End Date Taking? Authorizing Provider   fexofenadine (ALLEGRA) 180 MG tablet Take 180 mg by mouth As Needed.   Yes Jerry Woods MD   Probiotic Product (PROBIOTIC-10 PO) Take  by mouth.   Yes Jerry Woods MD   Vitamin D, Cholecalciferol, (CHOLECALCIFEROL) 10 MCG (400 UNIT) tablet Take 400 Units by mouth Daily.   Yes Jerry Woods MD   ibuprofen (ADVIL,MOTRIN) 800 MG tablet Take 1 tablet by mouth Every 8 (Eight) Hours As Needed for Moderate Pain  for up to 30 doses. 10/24/19   Himanshu Ambrocio MD   Multiple Vitamin (MULTI-VITAMIN DAILY PO) Take 1 tablet by mouth Every Morning.    ProviderJerry MD   rizatriptan (MAXALT) 10 MG tablet Take 1 tablet by mouth 1 (One) Time As Needed for Migraine. 10/5/20   Himanshu Ambrocio MD     ________________________________________    Current contraception: tubal ligation  History of abnormal Pap smear: no  Family history of uterine or ovarian cancer: no  Family History of colon cancer/colon polyps: no  History of abnormal mammogram: no  History of abnormal lipids: no    The following portions of the patient's history were reviewed and updated as appropriate: allergies, current  "medications, past family history, past medical history, past social history, past surgical history and problem list.    Review of Systems    Pertinent items are noted in HPI.       Objective   Physical Exam    /72   Ht 177.8 cm (70\")   Wt 108 kg (239 lb)   LMP  (LMP Unknown)   Breastfeeding No   BMI 34.29 kg/m²    BP Readings from Last 3 Encounters:   11/01/21 122/72   10/05/20 118/74   01/30/20 146/88      Wt Readings from Last 3 Encounters:   11/01/21 108 kg (239 lb)   10/05/20 103 kg (228 lb)   01/30/20 94.8 kg (209 lb)        BMI: Estimated body mass index is 34.29 kg/m² as calculated from the following:    Height as of this encounter: 177.8 cm (70\").    Weight as of this encounter: 108 kg (239 lb).       General: alert, appears stated age and cooperative   Heart: regular rate and rhythm, S1, S2 normal, no murmur, click, rub or gallop   Lungs: clear to auscultation bilaterally   Abdomen: soft, non-tender, without masses, no organomegaly   Breast: inspection negative, no nipple discharge or bleeding, no masses or nodularity palpable   External genitalia/Vulva: External genitalia including bartholin's glands, Urethra, [place]'s gland and urethra meatus are normal, Perineum, rectum and anus appear normal  and Bladder appears normal without significant prolapse    Vagina: normal mucosa, normal discharge   Cervix: multiparous appearance and no lesions   Uterus: normal size, mobile and non-tender   Adnexa: exam limited by habitus     As part of wellness and prevention, the following topics were discussed with the patient:  Encouraged self breast exam  Physical activity and regular exercised encouraged.       Assessment:    Diagnoses and all orders for this visit:    1. Well female exam with routine gynecological exam (Primary)  -     IgP, Aptima HPV    2. Screening for blood or protein in urine  -     POC Urinalysis Dipstick  -     Urine Culture - Urine, Urine, Clean Catch    3. Urinary frequency  -     POC " Urinalysis Dipstick  -     Urine Culture - Urine, Urine, Clean Catch    4. Weight gain  -     TSH  -     T4, free  -     Follicle Stimulating Hormone    5. Screening mammogram, encounter for  -     Mammo Screening Digital Tomosynthesis Bilateral With CAD; Future    6. Screening, anemia, deficiency, iron  -     CBC (No Diff)    7. Screening for metabolic disorder  -     Comprehensive Metabolic Panel    8. Screening for malignant neoplasm of cervix  -     IgP, Aptima HPV    9. Screening for human papillomavirus (HPV)  -     IgP, Aptima HPV    10. Screen for colon cancer  -     Ambulatory Referral For Screening Colonoscopy    11. Family history of carcinoma in situ of colon  -     Ambulatory Referral For Screening Colonoscopy    12. Periodic headache syndrome, not intractable  -     rizatriptan (MAXALT) 10 MG tablet; Take 1 tablet by mouth 1 (One) Time As Needed for Migraine.  Dispense: 9 tablet; Refill: 5    13. Migraine without aura and without status migrainosus, not intractable        Plan:  Return in about 1 year (around 11/1/2022) for Annual GYN exam.      Himanshu Ambrocio MD  11/1/2021 10:39 EDT

## 2021-11-02 LAB
ALBUMIN SERPL-MCNC: 4.6 G/DL (ref 3.8–4.8)
ALBUMIN/GLOB SERPL: 1.7 {RATIO} (ref 1.2–2.2)
ALP SERPL-CCNC: 79 IU/L (ref 44–121)
ALT SERPL-CCNC: 34 IU/L (ref 0–32)
AST SERPL-CCNC: 24 IU/L (ref 0–40)
BILIRUB SERPL-MCNC: 0.5 MG/DL (ref 0–1.2)
BUN SERPL-MCNC: 12 MG/DL (ref 6–24)
BUN/CREAT SERPL: 12 (ref 9–23)
CALCIUM SERPL-MCNC: 9.6 MG/DL (ref 8.7–10.2)
CHLORIDE SERPL-SCNC: 104 MMOL/L (ref 96–106)
CO2 SERPL-SCNC: 22 MMOL/L (ref 20–29)
CREAT SERPL-MCNC: 0.99 MG/DL (ref 0.57–1)
ERYTHROCYTE [DISTWIDTH] IN BLOOD BY AUTOMATED COUNT: 12.9 % (ref 11.7–15.4)
FSH SERPL-ACNC: 8.4 MIU/ML
GLOBULIN SER CALC-MCNC: 2.7 G/DL (ref 1.5–4.5)
GLUCOSE SERPL-MCNC: 107 MG/DL (ref 65–99)
HCT VFR BLD AUTO: 43.4 % (ref 34–46.6)
HGB BLD-MCNC: 14.9 G/DL (ref 11.1–15.9)
MCH RBC QN AUTO: 29.9 PG (ref 26.6–33)
MCHC RBC AUTO-ENTMCNC: 34.3 G/DL (ref 31.5–35.7)
MCV RBC AUTO: 87 FL (ref 79–97)
PLATELET # BLD AUTO: 294 X10E3/UL (ref 150–450)
POTASSIUM SERPL-SCNC: 3.7 MMOL/L (ref 3.5–5.2)
PROT SERPL-MCNC: 7.3 G/DL (ref 6–8.5)
RBC # BLD AUTO: 4.98 X10E6/UL (ref 3.77–5.28)
SODIUM SERPL-SCNC: 141 MMOL/L (ref 134–144)
T4 FREE SERPL-MCNC: 0.92 NG/DL (ref 0.82–1.77)
TSH SERPL DL<=0.005 MIU/L-ACNC: 2.14 UIU/ML (ref 0.45–4.5)
WBC # BLD AUTO: 8.2 X10E3/UL (ref 3.4–10.8)

## 2021-11-03 LAB
BACTERIA UR CULT: NORMAL
BACTERIA UR CULT: NORMAL

## 2021-11-03 NOTE — PROGRESS NOTES
Small deviations in yellow are not clinically significant.   Complete blood count is normal.  Complete metabolic panel is normal.  Thyroid screen is normal.  FSH is normal.  The patient is not menopausal.

## 2021-12-09 ENCOUNTER — PREP FOR SURGERY (OUTPATIENT)
Dept: SURGERY | Facility: SURGERY CENTER | Age: 46
End: 2021-12-09

## 2021-12-09 ENCOUNTER — HOSPITAL ENCOUNTER (OUTPATIENT)
Dept: MAMMOGRAPHY | Facility: HOSPITAL | Age: 46
Discharge: HOME | End: 2021-12-09
Admitting: OBSTETRICS & GYNECOLOGY

## 2021-12-09 DIAGNOSIS — Z12.31 SCREENING MAMMOGRAM, ENCOUNTER FOR: ICD-10-CM

## 2021-12-09 DIAGNOSIS — Z12.11 COLON CANCER SCREENING: Primary | ICD-10-CM

## 2021-12-09 PROCEDURE — 77067 SCR MAMMO BI INCL CAD: CPT

## 2021-12-09 PROCEDURE — 77063 BREAST TOMOSYNTHESIS BI: CPT

## 2021-12-13 PROBLEM — Z12.11 COLON CANCER SCREENING: Status: ACTIVE | Noted: 2021-12-13

## 2022-11-02 ENCOUNTER — OFFICE VISIT (OUTPATIENT)
Dept: OBSTETRICS AND GYNECOLOGY | Age: 47
End: 2022-11-02

## 2022-11-02 VITALS
SYSTOLIC BLOOD PRESSURE: 118 MMHG | DIASTOLIC BLOOD PRESSURE: 78 MMHG | WEIGHT: 241 LBS | HEIGHT: 70 IN | BODY MASS INDEX: 34.5 KG/M2

## 2022-11-02 DIAGNOSIS — Z13.89 SCREENING FOR BLOOD OR PROTEIN IN URINE: ICD-10-CM

## 2022-11-02 DIAGNOSIS — Z13.0 SCREENING, ANEMIA, DEFICIENCY, IRON: ICD-10-CM

## 2022-11-02 DIAGNOSIS — Z13.220 SCREENING FOR LIPID DISORDERS: ICD-10-CM

## 2022-11-02 DIAGNOSIS — Z01.419 WELL WOMAN EXAM WITH ROUTINE GYNECOLOGICAL EXAM: Primary | ICD-10-CM

## 2022-11-02 DIAGNOSIS — Z13.1 SCREENING FOR DIABETES MELLITUS: ICD-10-CM

## 2022-11-02 DIAGNOSIS — Z13.29 SCREENING FOR THYROID DISORDER: ICD-10-CM

## 2022-11-02 DIAGNOSIS — Z13.228 SCREENING FOR METABOLIC DISORDER: ICD-10-CM

## 2022-11-02 DIAGNOSIS — Z12.31 SCREENING MAMMOGRAM, ENCOUNTER FOR: ICD-10-CM

## 2022-11-02 DIAGNOSIS — Z12.4 SCREENING FOR MALIGNANT NEOPLASM OF THE CERVIX: ICD-10-CM

## 2022-11-02 DIAGNOSIS — Z11.51 SPECIAL SCREENING EXAMINATION FOR HUMAN PAPILLOMAVIRUS (HPV): ICD-10-CM

## 2022-11-02 DIAGNOSIS — G43.C0 PERIODIC HEADACHE SYNDROME, NOT INTRACTABLE: ICD-10-CM

## 2022-11-02 PROBLEM — N93.9 ABNORMAL UTERINE BLEEDING (AUB): Status: RESOLVED | Noted: 2018-10-22 | Resolved: 2022-11-02

## 2022-11-02 PROBLEM — Z12.11 COLON CANCER SCREENING: Status: RESOLVED | Noted: 2021-12-13 | Resolved: 2022-11-02

## 2022-11-02 LAB
BILIRUB BLD-MCNC: NEGATIVE MG/DL
GLUCOSE UR STRIP-MCNC: ABNORMAL MG/DL
KETONES UR QL: NEGATIVE
LEUKOCYTE EST, POC: NEGATIVE
NITRITE UR-MCNC: NEGATIVE MG/ML
PH UR: 5.5 [PH] (ref 5–8)
PROT UR STRIP-MCNC: NEGATIVE MG/DL
RBC # UR STRIP: ABNORMAL /UL
SP GR UR: 1.03 (ref 1–1.03)
UROBILINOGEN UR QL: NORMAL

## 2022-11-02 PROCEDURE — 81002 URINALYSIS NONAUTO W/O SCOPE: CPT | Performed by: OBSTETRICS & GYNECOLOGY

## 2022-11-02 PROCEDURE — 99396 PREV VISIT EST AGE 40-64: CPT | Performed by: OBSTETRICS & GYNECOLOGY

## 2022-11-02 RX ORDER — RIZATRIPTAN BENZOATE 10 MG/1
10 TABLET ORAL ONCE AS NEEDED
Qty: 9 TABLET | Refills: 10 | Status: SHIPPED | OUTPATIENT
Start: 2022-11-02

## 2022-11-02 NOTE — PROGRESS NOTES
[facility]   Obstetrics and Gynecology     2022    Patient: Pallavi Graham          MR#:[id number]    History of Present Illness    Chief Complaint   Patient presents with   • Gynecologic Exam     last pap 2021 neg, last mg 2021       47 y.o. female  who presents for annual exam.    The patient presents for her regular exam feeling well without any updates or complaints.    Studies reviewed:    No LMP recorded (lmp unknown). Patient has had an ablation.  Obstetric History:  OB History        3    Para   3    Term   3       0    AB   0    Living   3       SAB   0    IAB   0    Ectopic   0    Molar        Multiple   0    Live Births   3               Menstrual History:     No LMP recorded (lmp unknown). Patient has had an ablation.       Sexual History:       Social History     Substance and Sexual Activity   Sexual Activity Yes   • Partners: Male   • Birth control/protection: Tubal ligation     ______________________________________  Patient Active Problem List   Diagnosis   • Periodic headache syndrome     Past Medical History:   Diagnosis Date   • Heavy menstrual period    • Migraine    • PONV (postoperative nausea and vomiting)    • Spinal headache     WITH DELIVERY 2004 BLOOD PATCH     Past Surgical History:   Procedure Laterality Date   • ADENOIDECTOMY     • [place]STECTOMY      [facility] [place]   • D & C HYSTEROSCOPY ENDOMETRIAL ABLATION N/A 10/24/2019    Procedure: DILATATION AND CURETTAGE HYSTEROSCOPY NOVASURE ENDOMETRIAL ABLATION;  Surgeon: Himanshu Ambrocio MD;  Location: [facility];  Service: Obstetrics/Gynecology   • TONSILLECTOMY     • TUBAL COAGULATION LAPAROSCOPIC Bilateral 2016    Procedure: TUBAL FALLOPE FILSHIE CLIPPING LAPAROSCOPIC;  Surgeon: Himanshu Ambrocio MD;  Location: [facility];  Service:    • WISDOM TOOTH EXTRACTION       Social History     Tobacco Use   Smoking Status Never   Smokeless Tobacco Never     Family History   Problem  "Relation Age of Onset   • Hypertension Mother    • Uterine cancer Mother    • Diabetes Maternal Grandmother    • Ovarian cancer Maternal Grandmother    • Diabetes Maternal Grandfather    • Diabetes Paternal Grandmother    • Diabetes Paternal Grandfather    • Heart defect Sister    • No Known Problems Son    • No Known Problems Son    • Malig Hyperthermia Neg Hx    • Breast cancer Neg Hx      Prior to Admission medications    Medication Sig Start Date End Date Taking? Authorizing Provider   fexofenadine (ALLEGRA) 180 MG tablet Take 180 mg by mouth As Needed.   Yes Jerry Woods MD   Probiotic Product (PROBIOTIC-10 PO) Take  by mouth.   Yes ProviderJerry MD   Vitamin D, Cholecalciferol, (CHOLECALCIFEROL) 10 MCG (400 UNIT) tablet Take 400 Units by mouth Daily.   Yes Jerry Woods MD   rizatriptan (MAXALT) 10 MG tablet Take 1 tablet by mouth 1 (One) Time As Needed for Migraine. 11/1/21   Himanshu Ambrocio MD     _______________________________________    Current contraception: tubal ligation  History of abnormal Pap smear: no  Family history of uterine or ovarian cancer: no  Family History of colon cancer/colon polyps: no  History of abnormal mammogram: no  History of abnormal lipids: no    The following portions of the patient's history were reviewed and updated as appropriate: allergies, current medications, past family history, past medical history, past social history, past surgical history and problem list.    Review of Systems    Pertinent items are noted in HPI.       Objective   Physical Exam    /78   Ht 177.8 cm (70\")   Wt 109 kg (241 lb)   LMP  (LMP Unknown)   BMI 34.58 kg/m²    BP Readings from Last 3 Encounters:   11/02/22 118/78   11/01/21 122/72   10/05/20 118/74      Wt Readings from Last 3 Encounters:   11/02/22 109 kg (241 lb)   11/01/21 108 kg (239 lb)   10/05/20 103 kg (228 lb)        BMI: Estimated body mass index is 34.58 kg/m² as calculated from the following:    " "Height as of this encounter: 177.8 cm (70\").    Weight as of this encounter: 109 kg (241 lb).       General: alert, appears stated age and cooperative   Heart: regular rate and rhythm, S1, S2 normal, no murmur, click, rub or gallop   Lungs: clear to auscultation bilaterally   Abdomen: soft, non-tender, without masses, no organomegaly   Breast: inspection negative, no nipple discharge or bleeding, no masses or nodularity palpable   External genitalia/Vulva: External genitalia including bartholin's glands, Urethra, [place]'s gland and urethra meatus are normal, Perineum, rectum and anus appear normal  and Bladder appears normal without significant prolapse    Vagina: normal mucosa, normal discharge   Cervix: no lesions   Uterus: normal size, mobile and non-tender   Adnexa: normal adnexa     As part of wellness and prevention, the following topics were discussed with the patient:  Encouraged self breast exam  Physical activity and regular exercised encouraged.               Assessment:  Diagnoses and all orders for this visit:    1. Well woman exam with routine gynecological exam (Primary)  -     IgP, Aptima HPV    2. Screening for malignant neoplasm of the cervix  -     IgP, Aptima HPV    3. Special screening examination for human papillomavirus (HPV)  -     IgP, Aptima HPV    4. Screening for blood or protein in urine  -     POC Urinalysis Dipstick    5. Screening mammogram, encounter for  -     Mammo Screening Digital Tomosynthesis Bilateral With CAD; Future    6. Periodic headache syndrome, not intractable  -     rizatriptan (MAXALT) 10 MG tablet; Take 1 tablet by mouth 1 (One) Time As Needed for Migraine.  Dispense: 9 tablet; Refill: 10    7. Screening, anemia, deficiency, iron  -     CBC (No Diff)    8. Screening for metabolic disorder  -     Comprehensive Metabolic Panel    9. Screening for diabetes mellitus  -     Hemoglobin A1c    10. Screening for lipid disorders  -     Lipid Panel    11. Screening for thyroid " disorder  -     TSH  -     T4, free      Plan:  No follow-ups on file.    Himanshu Ambrocio MD  11/2/2022 15:18 EDT

## 2022-11-03 LAB
ALBUMIN SERPL-MCNC: 4.9 G/DL (ref 3.8–4.8)
ALBUMIN/GLOB SERPL: 2.2 {RATIO} (ref 1.2–2.2)
ALP SERPL-CCNC: 87 IU/L (ref 44–121)
ALT SERPL-CCNC: 30 IU/L (ref 0–32)
AST SERPL-CCNC: 21 IU/L (ref 0–40)
BILIRUB SERPL-MCNC: 0.4 MG/DL (ref 0–1.2)
BUN SERPL-MCNC: 18 MG/DL (ref 6–24)
BUN/CREAT SERPL: 18 (ref 9–23)
CALCIUM SERPL-MCNC: 9.4 MG/DL (ref 8.7–10.2)
CHLORIDE SERPL-SCNC: 104 MMOL/L (ref 96–106)
CHOLEST SERPL-MCNC: 154 MG/DL (ref 100–199)
CO2 SERPL-SCNC: 22 MMOL/L (ref 20–29)
CREAT SERPL-MCNC: 0.98 MG/DL (ref 0.57–1)
EGFRCR SERPLBLD CKD-EPI 2021: 72 ML/MIN/1.73
ERYTHROCYTE [DISTWIDTH] IN BLOOD BY AUTOMATED COUNT: 13.2 % (ref 11.7–15.4)
GLOBULIN SER CALC-MCNC: 2.2 G/DL (ref 1.5–4.5)
GLUCOSE SERPL-MCNC: ABNORMAL MG/DL
HBA1C MFR BLD: 5.4 % (ref 4.8–5.6)
HCT VFR BLD AUTO: 42.8 % (ref 34–46.6)
HDLC SERPL-MCNC: 42 MG/DL
HGB BLD-MCNC: 14.7 G/DL (ref 11.1–15.9)
LDLC SERPL CALC-MCNC: 89 MG/DL (ref 0–99)
MCH RBC QN AUTO: 30 PG (ref 26.6–33)
MCHC RBC AUTO-ENTMCNC: 34.3 G/DL (ref 31.5–35.7)
MCV RBC AUTO: 87 FL (ref 79–97)
PLATELET # BLD AUTO: 284 X10E3/UL (ref 150–450)
POTASSIUM SERPL-SCNC: ABNORMAL MMOL/L
PROT SERPL-MCNC: 7.1 G/DL (ref 6–8.5)
RBC # BLD AUTO: 4.9 X10E6/UL (ref 3.77–5.28)
SODIUM SERPL-SCNC: 141 MMOL/L (ref 134–144)
T4 FREE SERPL-MCNC: 0.94 NG/DL (ref 0.82–1.77)
TRIGL SERPL-MCNC: 128 MG/DL (ref 0–149)
TSH SERPL DL<=0.005 MIU/L-ACNC: 1.87 UIU/ML (ref 0.45–4.5)
VLDLC SERPL CALC-MCNC: 23 MG/DL (ref 5–40)
WBC # BLD AUTO: 8.3 X10E3/UL (ref 3.4–10.8)

## 2022-11-03 NOTE — PROGRESS NOTES
Small deviations in yellow may not be clinically significant unless addressed below  Complete blood count is normal.  Complete metabolic panel is normal.  Thyroid screen is normal.  Screening A1c for diabetes is normal.  Lipid panel is normal suggesting a low risk for developing coronary artery disease.

## 2022-12-13 ENCOUNTER — HOSPITAL ENCOUNTER (OUTPATIENT)
Dept: MAMMOGRAPHY | Facility: HOSPITAL | Age: 47
Discharge: HOME | End: 2022-12-13
Admitting: OBSTETRICS & GYNECOLOGY

## 2022-12-13 DIAGNOSIS — Z12.31 SCREENING MAMMOGRAM, ENCOUNTER FOR: ICD-10-CM

## 2022-12-13 PROCEDURE — 77063 BREAST TOMOSYNTHESIS BI: CPT

## 2022-12-13 PROCEDURE — 77067 SCR MAMMO BI INCL CAD: CPT

## 2023-11-15 ENCOUNTER — OFFICE VISIT (OUTPATIENT)
Dept: OBSTETRICS AND GYNECOLOGY | Age: 48
End: 2023-11-15
Payer: COMMERCIAL

## 2023-11-15 VITALS
SYSTOLIC BLOOD PRESSURE: 122 MMHG | WEIGHT: 235 LBS | DIASTOLIC BLOOD PRESSURE: 78 MMHG | HEIGHT: 70 IN | BODY MASS INDEX: 33.64 KG/M2

## 2023-11-15 DIAGNOSIS — Z13.220 SCREENING FOR LIPID DISORDERS: ICD-10-CM

## 2023-11-15 DIAGNOSIS — Z13.29 SCREENING FOR THYROID DISORDER: ICD-10-CM

## 2023-11-15 DIAGNOSIS — L91.8 FIBROEPITHELIAL POLYP: ICD-10-CM

## 2023-11-15 DIAGNOSIS — Z12.4 SCREENING FOR MALIGNANT NEOPLASM OF CERVIX: ICD-10-CM

## 2023-11-15 DIAGNOSIS — Z13.0 SCREENING, ANEMIA, DEFICIENCY, IRON: ICD-10-CM

## 2023-11-15 DIAGNOSIS — Z01.419 WELL FEMALE EXAM WITH ROUTINE GYNECOLOGICAL EXAM: Primary | ICD-10-CM

## 2023-11-15 DIAGNOSIS — Z12.11 SCREEN FOR COLON CANCER: ICD-10-CM

## 2023-11-15 DIAGNOSIS — Z12.31 SCREENING MAMMOGRAM FOR BREAST CANCER: ICD-10-CM

## 2023-11-15 DIAGNOSIS — Z13.1 SCREENING FOR DIABETES MELLITUS: ICD-10-CM

## 2023-11-15 DIAGNOSIS — Z11.51 SCREENING FOR HUMAN PAPILLOMAVIRUS (HPV): ICD-10-CM

## 2023-11-15 DIAGNOSIS — G43.C0 PERIODIC HEADACHE SYNDROME, NOT INTRACTABLE: ICD-10-CM

## 2023-11-15 DIAGNOSIS — Z13.228 SCREENING FOR METABOLIC DISORDER: ICD-10-CM

## 2023-11-15 RX ORDER — RIZATRIPTAN BENZOATE 10 MG/1
10 TABLET ORAL ONCE AS NEEDED
Qty: 9 TABLET | Refills: 10 | Status: SHIPPED | OUTPATIENT
Start: 2023-11-15

## 2023-11-15 NOTE — PROGRESS NOTES
[facility]   Obstetrics and Gynecology     11/15/2023    Patient: Pallavi Graham          MR#:[id number]    History of Present Illness    Chief Complaint   Patient presents with    Gynecologic Exam     CC: AE, last pap 2022 neg HPV neg, last mammo 2022 neg,  No complaints today.       48 y.o. female  who presents for annual exam.    The patient presents for her regular exam feeling well without complaints.  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        Relevant data reviewed:    No LMP recorded. Patient has had an ablation.  Obstetric History:  OB History          3    Para   3    Term   3       0    AB   0    Living   3         SAB   0    IAB   0    Ectopic   0    Molar        Multiple   0    Live Births   3               Menstrual History:     No LMP recorded. Patient has had an ablation.       Social History     Substance and Sexual Activity   Sexual Activity Yes    Partners: Male    Birth control/protection: Tubal ligation     ______________________________________  Patient Active Problem List   Diagnosis    Periodic headache syndrome     Past Medical History:   Diagnosis Date    Heavy menstrual period     Migraine     PONV (postoperative nausea and vomiting)     Spinal headache     WITH DELIVERY 2004 BLOOD PATCH     Past Surgical History:   Procedure Laterality Date    ADENOIDECTOMY      CHOLECYSTECTOMY      [facility] [place]    D & C HYSTEROSCOPY ENDOMETRIAL ABLATION N/A 10/24/2019    Procedure: DILATATION AND CURETTAGE HYSTEROSCOPY NOVASURE ENDOMETRIAL ABLATION;  Surgeon: Himanshu Ambrocio MD;  Location: [facility] OR;  Service: Obstetrics/Gynecology    TONSILLECTOMY  2001    TUBAL COAGULATION LAPAROSCOPIC Bilateral 2016    Procedure: TUBAL FALLOPE FILSHIE CLIPPING LAPAROSCOPIC;  Surgeon: Himanshu Ambrocio MD;  Location: [facility] OR;  Service:     WISDOM TOOTH EXTRACTION        Social History     Tobacco Use   Smoking Status Never   Smokeless Tobacco Never     Family History   Problem Relation Age of Onset    Hypertension Mother     Uterine cancer Mother     Diabetes Maternal Grandmother     Ovarian cancer Maternal Grandmother     Diabetes Maternal Grandfather     Diabetes Paternal Grandmother     Diabetes Paternal Grandfather     Heart defect Sister     No Known Problems Son     No Known Problems Son     Stephy Hyperthermia Neg Hx     Breast cancer Neg Hx      Prior to Admission medications    Medication Sig Start Date End Date Taking? Authorizing Provider   fexofenadine (ALLEGRA) 180 MG tablet Take 1 tablet by mouth As Needed.   Yes Jerry Woods MD   Probiotic Product (PROBIOTIC-10 PO) Take  by mouth.   Yes Jerry Woods MD   rizatriptan (MAXALT) 10 MG tablet Take 1 tablet by mouth 1 (One) Time As Needed for Migraine. 11/2/22  Yes Himanshu Ambrocio MD   Vitamin D, Cholecalciferol, (CHOLECALCIFEROL) 10 MCG (400 UNIT) tablet Take 1 tablet by mouth Daily.   Yes Jerry Woods MD     _______________________________________    Current contraception: tubal ligation  History of abnormal Pap smear: no  Family history of uterine or ovarian cancer: no  Family History of colon cancer/colon polyps: no  History of abnormal mammogram: no  History of abnormal lipids: no    The following portions of the patient's history were reviewed and updated as appropriate: allergies, current medications, past family history, past medical history, past social history, past surgical history, and problem list.    Review of Systems    Pertinent items are noted in HPI.       Objective   Physical Exam  Genitourinary:            Procedure Note     The area denoted is cleaned well with betadine and injected with 2 cc of 1% lidocaine with epinephrine.    The area is incised with #11 blade.  Suture is placed at the base.     The procedure is well tolerated by the patient         /78   Ht  "177.8 cm (70\")   Wt 107 kg (235 lb)   BMI 33.72 kg/m²    BP Readings from Last 3 Encounters:   11/15/23 122/78   22 118/78   21 122/72      Wt Readings from Last 3 Encounters:   11/15/23 107 kg (235 lb)   22 109 kg (241 lb)   21 108 kg (239 lb)        BMI: Estimated body mass index is 33.72 kg/m² as calculated from the following:    Height as of this encounter: 177.8 cm (70\").    Weight as of this encounter: 107 kg (235 lb).       General: alert, appears stated age, and cooperative   Heart: regular rate and rhythm, S1, S2 normal, no murmur, click, rub or gallop   Lungs: clear to auscultation bilaterally   Abdomen: soft, non-tender, without masses, no organomegaly   Breast: inspection negative, no nipple discharge or bleeding, no masses or nodularity palpable   External genitalia/Vulva: Right vulvar fibroepithelial polyp, External genitalia including bartholin's glands, Urethra, Coleta's gland and urethra meatus are normal, Perineum, rectum and anus appear normal , and Bladder appears normal without significant prolapse    Vagina: normal mucosa, normal discharge   Cervix: no lesions   Uterus: normal size and non-tender   Adnexa: normal adnexa     As part of wellness and prevention, the following topics were discussed with the patient:  Encouraged self breast exam  Physical activity and regular exercised encouraged.         Problem List   Meds  History  Prep for Surg   Imagin}    Assessment:  Diagnoses and all orders for this visit:    1. Well female exam with routine gynecological exam (Primary)  -     IGP, Apt HPV,rfx 16 / 18,45    2. Screening for human papillomavirus (HPV)  -     IGP, Apt HPV,rfx 16 / 18,45    3. Screening for malignant neoplasm of cervix  -     IGP, Apt HPV,rfx 16 / 18,45    4. Screening mammogram for breast cancer  -     Mammo Screening Digital Tomosynthesis Bilateral With CAD; Future    5. Screen for colon cancer  -     Cologuard - Stool, Per Rectum; " Future    6. Screening, anemia, deficiency, iron  -     CBC (No Diff)    7. Screening for diabetes mellitus  -     Hemoglobin A1c    8. Screening for thyroid disorder  -     TSH  -     T4, free    9. Screening for lipid disorders  -     Lipid Panel    10. Screening for metabolic disorder  -     Comprehensive Metabolic Panel    11. Fibroepithelial polyp    12. Periodic headache syndrome, not intractable  -     rizatriptan (MAXALT) 10 MG tablet; Take 1 tablet by mouth 1 (One) Time As Needed for Migraine.  Dispense: 9 tablet; Refill: 10      Plan:  Return in 1 year (on 11/15/2024) for Annual exam.    Himanshu Ambrocio MD  11/15/2023 09:58 EST

## 2023-11-16 LAB
ALBUMIN SERPL-MCNC: 4.8 G/DL (ref 3.9–4.9)
ALBUMIN/GLOB SERPL: 2.1 {RATIO} (ref 1.2–2.2)
ALP SERPL-CCNC: 69 IU/L (ref 44–121)
ALT SERPL-CCNC: 31 IU/L (ref 0–32)
AST SERPL-CCNC: 22 IU/L (ref 0–40)
BILIRUB SERPL-MCNC: 0.6 MG/DL (ref 0–1.2)
BUN SERPL-MCNC: 14 MG/DL (ref 6–24)
BUN/CREAT SERPL: 14 (ref 9–23)
CALCIUM SERPL-MCNC: 10.1 MG/DL (ref 8.7–10.2)
CHLORIDE SERPL-SCNC: 104 MMOL/L (ref 96–106)
CHOLEST SERPL-MCNC: 165 MG/DL (ref 100–199)
CO2 SERPL-SCNC: 23 MMOL/L (ref 20–29)
CREAT SERPL-MCNC: 0.99 MG/DL (ref 0.57–1)
EGFRCR SERPLBLD CKD-EPI 2021: 70 ML/MIN/1.73
ERYTHROCYTE [DISTWIDTH] IN BLOOD BY AUTOMATED COUNT: 13.6 % (ref 11.7–15.4)
GLOBULIN SER CALC-MCNC: 2.3 G/DL (ref 1.5–4.5)
GLUCOSE SERPL-MCNC: 91 MG/DL (ref 70–99)
HBA1C MFR BLD: 5.3 % (ref 4.8–5.6)
HCT VFR BLD AUTO: 46.1 % (ref 34–46.6)
HDLC SERPL-MCNC: 52 MG/DL
HGB BLD-MCNC: 15.4 G/DL (ref 11.1–15.9)
LDLC SERPL CALC-MCNC: 98 MG/DL (ref 0–99)
MCH RBC QN AUTO: 30.4 PG (ref 26.6–33)
MCHC RBC AUTO-ENTMCNC: 33.4 G/DL (ref 31.5–35.7)
MCV RBC AUTO: 91 FL (ref 79–97)
PLATELET # BLD AUTO: 302 X10E3/UL (ref 150–450)
POTASSIUM SERPL-SCNC: 4.4 MMOL/L (ref 3.5–5.2)
PROT SERPL-MCNC: 7.1 G/DL (ref 6–8.5)
RBC # BLD AUTO: 5.06 X10E6/UL (ref 3.77–5.28)
SODIUM SERPL-SCNC: 143 MMOL/L (ref 134–144)
T4 FREE SERPL-MCNC: 1.13 NG/DL (ref 0.82–1.77)
TRIGL SERPL-MCNC: 78 MG/DL (ref 0–149)
TSH SERPL DL<=0.005 MIU/L-ACNC: 1.56 UIU/ML (ref 0.45–4.5)
VLDLC SERPL CALC-MCNC: 15 MG/DL (ref 5–40)
WBC # BLD AUTO: 8.1 X10E3/UL (ref 3.4–10.8)

## 2023-11-21 LAB
DX ICD CODE: NORMAL
PATH REPORT.FINAL DX SPEC: NORMAL
PATH REPORT.GROSS SPEC: NORMAL
PATH REPORT.SITE OF ORIGIN SPEC: NORMAL
PATHOLOGIST NAME: NORMAL
PAYMENT PROCEDURE: NORMAL

## 2023-12-22 ENCOUNTER — HOSPITAL ENCOUNTER (OUTPATIENT)
Dept: MAMMOGRAPHY | Facility: HOSPITAL | Age: 48
Discharge: HOME | End: 2023-12-22
Admitting: OBSTETRICS & GYNECOLOGY
Payer: COMMERCIAL

## 2023-12-22 DIAGNOSIS — Z12.31 SCREENING MAMMOGRAM FOR BREAST CANCER: ICD-10-CM

## 2023-12-22 PROCEDURE — 77067 SCR MAMMO BI INCL CAD: CPT

## 2023-12-22 PROCEDURE — 77063 BREAST TOMOSYNTHESIS BI: CPT

## 2024-11-20 ENCOUNTER — OFFICE VISIT (OUTPATIENT)
Dept: OBSTETRICS AND GYNECOLOGY | Age: 49
End: 2024-11-20
Payer: COMMERCIAL

## 2024-11-20 VITALS
SYSTOLIC BLOOD PRESSURE: 128 MMHG | DIASTOLIC BLOOD PRESSURE: 84 MMHG | BODY MASS INDEX: 34.36 KG/M2 | WEIGHT: 240 LBS | HEIGHT: 70 IN

## 2024-11-20 DIAGNOSIS — Z12.11 SCREEN FOR COLON CANCER: ICD-10-CM

## 2024-11-20 DIAGNOSIS — Z13.29 SCREENING FOR THYROID DISORDER: ICD-10-CM

## 2024-11-20 DIAGNOSIS — L91.8 FIBROEPITHELIAL POLYP: ICD-10-CM

## 2024-11-20 DIAGNOSIS — Z12.31 SCREENING MAMMOGRAM FOR BREAST CANCER: ICD-10-CM

## 2024-11-20 DIAGNOSIS — Z12.4 SCREENING FOR MALIGNANT NEOPLASM OF CERVIX: ICD-10-CM

## 2024-11-20 DIAGNOSIS — Z13.228 SCREENING FOR METABOLIC DISORDER: ICD-10-CM

## 2024-11-20 DIAGNOSIS — Z13.89 SCREENING FOR BLOOD OR PROTEIN IN URINE: ICD-10-CM

## 2024-11-20 DIAGNOSIS — Z01.419 WELL FEMALE EXAM WITH ROUTINE GYNECOLOGICAL EXAM: Primary | ICD-10-CM

## 2024-11-20 DIAGNOSIS — Z11.51 SCREENING FOR HUMAN PAPILLOMAVIRUS (HPV): ICD-10-CM

## 2024-11-20 DIAGNOSIS — Z13.0 SCREENING, ANEMIA, DEFICIENCY, IRON: ICD-10-CM

## 2024-11-20 DIAGNOSIS — Z13.220 SCREENING FOR LIPID DISORDERS: ICD-10-CM

## 2024-11-20 DIAGNOSIS — Z13.1 SCREENING FOR DIABETES MELLITUS: ICD-10-CM

## 2024-11-20 LAB
BILIRUB BLD-MCNC: NEGATIVE MG/DL
CLARITY, POC: CLEAR
COLOR UR: YELLOW
GLUCOSE UR STRIP-MCNC: NEGATIVE MG/DL
KETONES UR QL: NEGATIVE
LEUKOCYTE EST, POC: NEGATIVE
NITRITE UR-MCNC: NEGATIVE MG/ML
PH UR: 6 [PH] (ref 5–8)
PROT UR STRIP-MCNC: NEGATIVE MG/DL
RBC # UR STRIP: ABNORMAL /UL
SP GR UR: 1.01 (ref 1–1.03)
UROBILINOGEN UR QL: NORMAL

## 2024-11-20 NOTE — PROGRESS NOTES
[facility]   Obstetrics and Gynecology     2024    Patient: Pallavi Graham          MR#:[id number]    History of Present Illness    Chief Complaint   Patient presents with    Gynecologic Exam     CC:Annual, last pap 11/15/23 neg, hpv neg, mammo neg       49 y.o. female  who presents for annual exam.    The patient presents for routine annual check feeling well without complaints.  She does report 2 focal areas on her vulva that are bothersome.  The patient's had similar fibroepithelial polyps removed previously.    Relevant data reviewed:    Patient's last menstrual period was 10/26/2024.  Obstetric History:  OB History          3    Para   3    Term   3       0    AB   0    Living   3         SAB   0    IAB   0    Ectopic   0    Molar        Multiple   0    Live Births   3               Menstrual History:     Patient's last menstrual period was 10/26/2024.       Social History     Substance and Sexual Activity   Sexual Activity Yes    Partners: Male    Birth control/protection: Tubal ligation     ______________________________________  Patient Active Problem List   Diagnosis    Periodic headache syndrome     Past Medical History:   Diagnosis Date    Migraine     PONV (postoperative nausea and vomiting)     Spinal headache     WITH DELIVERY 2004 BLOOD PATCH    Varicella     Had it as a child     Past Surgical History:   Procedure Laterality Date    ADENOIDECTOMY      D & C HYSTEROSCOPY ENDOMETRIAL ABLATION N/A 10/24/2019    Procedure: DILATATION AND CURETTAGE HYSTEROSCOPY NOVASURE ENDOMETRIAL ABLATION;  Surgeon: Himanshu Ambrocio MD;  Location: [facility] OR;  Service: Obstetrics/Gynecology    LAPAROSCOPIC CHOLECYSTECTOMY      C    TONSILLECTOMY  2001    TUBAL COAGULATION LAPAROSCOPIC Bilateral 2016    Procedure: TUBAL FALLOPE FILSHIE CLIPPING LAPAROSCOPIC;  Surgeon: Himanshu Ambrocio MD;  Location: [facility] OR;  Service:     WISDOM TOOTH EXTRACTION    "    Social History     Tobacco Use   Smoking Status Never   Smokeless Tobacco Never     Family History   Problem Relation Age of Onset    Hypertension Mother     Uterine cancer Mother     Diabetes Maternal Grandmother     Ovarian cancer Maternal Grandmother     Diabetes Maternal Grandfather     Diabetes Paternal Grandmother     Diabetes Paternal Grandfather     Heart defect Sister     No Known Problems Son     No Known Problems Son     Stephy Hyperthermia Neg Hx     Breast cancer Neg Hx      Prior to Admission medications    Medication Sig Start Date End Date Taking? Authorizing Provider   fexofenadine (ALLEGRA) 180 MG tablet Take 1 tablet by mouth As Needed.   Yes Jerry Woods MD   rizatriptan (MAXALT) 10 MG tablet Take 1 tablet by mouth 1 (One) Time As Needed for Migraine. 11/15/23  Yes Himanshu Ambrocio MD   Vitamin D, Cholecalciferol, (CHOLECALCIFEROL) 10 MCG (400 UNIT) tablet Take 1 tablet by mouth Daily.   Yes Jerry Woods MD   Probiotic Product (PROBIOTIC-10 PO) Take  by mouth.  11/20/24  Jerry Woods MD     _______________________________________    Current contraception: tubal ligation  History of abnormal Pap smear: no  Family history of uterine or ovarian cancer: no  Family History of colon cancer/colon polyps: no  History of abnormal mammogram: no  History of abnormal lipids: no    The following portions of the patient's history were reviewed and updated as appropriate: allergies, current medications, past family history, past medical history, past social history, past surgical history, and problem list.    Review of Systems    Pertinent items are noted in HPI.       Objective   Physical Exam  Genitourinary:            /84   Ht 177.8 cm (70\")   Wt 109 kg (240 lb)   LMP 10/26/2024   BMI 34.44 kg/m²    BP Readings from Last 3 Encounters:   11/20/24 128/84   11/15/23 122/78   11/02/22 118/78      Wt Readings from Last 3 Encounters:   11/20/24 109 kg (240 lb)   11/15/23 107 " "kg (235 lb)   22 109 kg (241 lb)        BMI: Estimated body mass index is 34.44 kg/m² as calculated from the following:    Height as of this encounter: 177.8 cm (70\").    Weight as of this encounter: 109 kg (240 lb).       General: alert, appears stated age, and cooperative   Heart: regular rate and rhythm, S1, S2 normal, no murmur, click, rub or gallop   Lungs: clear to auscultation bilaterally   Abdomen: soft, non-tender, without masses, no organomegaly   Breast: inspection negative, no nipple discharge or bleeding, no masses or nodularity palpable   External genitalia/Vulva: Fibroepithelial polyps x 2 see notes, External genitalia including bartholin's glands, Urethra, Leal's gland and urethra meatus are normal, Perineum, rectum and anus appear normal , and Bladder appears normal without significant prolapse    Vagina: normal mucosa, normal discharge   Cervix: no lesions   Uterus: normal size and non-tender   Adnexa: normal adnexa     As part of wellness and prevention, the following topics were discussed with the patient:  Encouraged self breast exam  Physical activity and regular exercised encouraged.       Problem List   Meds  History  Prep for Surg   Imagin}    Assessment:  Diagnoses and all orders for this visit:    1. Well female exam with routine gynecological exam (Primary)  -     IGP, Apt HPV,rfx 16 / 18,45    2. Screening for human papillomavirus (HPV)  -     IGP, Apt HPV,rfx 16 / 18,45    3. Screening for malignant neoplasm of cervix  -     IGP, Apt HPV,rfx 16 / 18,45    4. Screening mammogram for breast cancer  -     Mammo Screening Digital Tomosynthesis Bilateral With CAD; Future    5. Screening for blood or protein in urine  -     POC Urinalysis Dipstick    6. Screening, anemia, deficiency, iron  -     CBC (No Diff)    7. Screening for metabolic disorder  -     Comprehensive Metabolic Panel    8. Screening for lipid disorders  -     Lipid Panel    9. Screening for diabetes mellitus  - "     Hemoglobin A1c    10. Screening for thyroid disorder  -     TSH    11. Fibroepithelial polyp  Comments:  return for removal    12. Screen for colon cancer  -     Ambulatory Referral For Screening Colonoscopy      Plan:  Return in 1 year (on 11/20/2025) for Annual exam.    Himanshu Ambrocio MD  11/20/2024 14:24 EST

## 2024-11-21 LAB
ALBUMIN SERPL-MCNC: 4.6 G/DL (ref 3.9–4.9)
ALP SERPL-CCNC: 77 IU/L (ref 44–121)
ALT SERPL-CCNC: 24 IU/L (ref 0–32)
AST SERPL-CCNC: 15 IU/L (ref 0–40)
BILIRUB SERPL-MCNC: 0.6 MG/DL (ref 0–1.2)
BUN SERPL-MCNC: 13 MG/DL (ref 6–24)
BUN/CREAT SERPL: 13 (ref 9–23)
CALCIUM SERPL-MCNC: 9.7 MG/DL (ref 8.7–10.2)
CHLORIDE SERPL-SCNC: 104 MMOL/L (ref 96–106)
CHOLEST SERPL-MCNC: 168 MG/DL (ref 100–199)
CO2 SERPL-SCNC: 23 MMOL/L (ref 20–29)
CREAT SERPL-MCNC: 1.03 MG/DL (ref 0.57–1)
EGFRCR SERPLBLD CKD-EPI 2021: 67 ML/MIN/1.73
ERYTHROCYTE [DISTWIDTH] IN BLOOD BY AUTOMATED COUNT: 12.9 % (ref 11.7–15.4)
GLOBULIN SER CALC-MCNC: 2.4 G/DL (ref 1.5–4.5)
GLUCOSE SERPL-MCNC: 173 MG/DL (ref 70–99)
HBA1C MFR BLD: 5.5 % (ref 4.8–5.6)
HCT VFR BLD AUTO: 45.5 % (ref 34–46.6)
HDLC SERPL-MCNC: 44 MG/DL
HGB BLD-MCNC: 15.2 G/DL (ref 11.1–15.9)
LDLC SERPL CALC-MCNC: 108 MG/DL (ref 0–99)
MCH RBC QN AUTO: 29.8 PG (ref 26.6–33)
MCHC RBC AUTO-ENTMCNC: 33.4 G/DL (ref 31.5–35.7)
MCV RBC AUTO: 89 FL (ref 79–97)
PLATELET # BLD AUTO: 262 X10E3/UL (ref 150–450)
POTASSIUM SERPL-SCNC: 3.9 MMOL/L (ref 3.5–5.2)
PROT SERPL-MCNC: 7 G/DL (ref 6–8.5)
RBC # BLD AUTO: 5.1 X10E6/UL (ref 3.77–5.28)
SODIUM SERPL-SCNC: 142 MMOL/L (ref 134–144)
TRIGL SERPL-MCNC: 87 MG/DL (ref 0–149)
TSH SERPL DL<=0.005 MIU/L-ACNC: 1.71 UIU/ML (ref 0.45–4.5)
VLDLC SERPL CALC-MCNC: 16 MG/DL (ref 5–40)
WBC # BLD AUTO: 7 X10E3/UL (ref 3.4–10.8)

## 2024-11-21 NOTE — PROGRESS NOTES
Small deviations in yellow may not be clinically significant unless addressed below  Complete blood count is normal.  Complete metabolic panel is normal except for elevated glucose.   A1c is normal suspect patient consumed something sweet.   Thyroid screen is normal.  Screening A1c for diabetes is normal.  Lipid panel is normal suggesting a low risk for developing coronary artery disease.

## 2024-11-28 LAB
CYTOLOGIST CVX/VAG CYTO: NORMAL
CYTOLOGY CVX/VAG DOC CYTO: NORMAL
CYTOLOGY CVX/VAG DOC THIN PREP: NORMAL
DX ICD CODE: NORMAL
HPV I/H RISK 4 DNA CVX QL PROBE+SIG AMP: NEGATIVE
Lab: NORMAL
OTHER STN SPEC: NORMAL
STAT OF ADQ CVX/VAG CYTO-IMP: NORMAL

## 2025-01-03 ENCOUNTER — PREP FOR SURGERY (OUTPATIENT)
Dept: OTHER | Facility: HOSPITAL | Age: 50
End: 2025-01-03
Payer: COMMERCIAL

## 2025-01-03 DIAGNOSIS — Z12.11 SCREEN FOR COLON CANCER: Primary | ICD-10-CM

## 2025-03-11 DIAGNOSIS — G43.C0 PERIODIC HEADACHE SYNDROME, NOT INTRACTABLE: ICD-10-CM

## 2025-03-11 RX ORDER — RIZATRIPTAN BENZOATE 10 MG/1
TABLET ORAL
Qty: 9 TABLET | Refills: 10 | Status: SHIPPED | OUTPATIENT
Start: 2025-03-11

## 2025-03-21 ENCOUNTER — HOSPITAL ENCOUNTER (OUTPATIENT)
Dept: MAMMOGRAPHY | Facility: HOSPITAL | Age: 50
Discharge: HOME | End: 2025-03-21
Admitting: OBSTETRICS & GYNECOLOGY
Payer: COMMERCIAL

## 2025-03-21 DIAGNOSIS — Z12.31 SCREENING MAMMOGRAM FOR BREAST CANCER: ICD-10-CM

## 2025-03-21 PROCEDURE — 77063 BREAST TOMOSYNTHESIS BI: CPT

## 2025-03-21 PROCEDURE — 77067 SCR MAMMO BI INCL CAD: CPT

## (undated) DEVICE — SOL NACL 0.9PCT 1000ML

## (undated) DEVICE — GLV SURG BIOGEL LTX PF 7 1/2

## (undated) DEVICE — ST IRR CYSTO W/SPK 77IN LF

## (undated) DEVICE — STRAP STIRUP WO/ RNG

## (undated) DEVICE — LOU D & C HYSTEROSCOPY: Brand: MEDLINE INDUSTRIES, INC.

## (undated) DEVICE — SKIN PREP TRAY W/CHG: Brand: MEDLINE INDUSTRIES, INC.

## (undated) DEVICE — PAD SANI MAXI W/ADHS SNG WRP 11IN

## (undated) DEVICE — PROB ABL ENDOMTRL NOVASURE/G4 W/SURESND